# Patient Record
Sex: FEMALE | Race: WHITE | Employment: OTHER | URBAN - METROPOLITAN AREA
[De-identification: names, ages, dates, MRNs, and addresses within clinical notes are randomized per-mention and may not be internally consistent; named-entity substitution may affect disease eponyms.]

---

## 2022-12-10 PROBLEM — M81.0 AGE RELATED OSTEOPOROSIS: Status: ACTIVE | Noted: 2022-12-10

## 2022-12-10 PROBLEM — E03.9 HYPOTHYROIDISM: Status: ACTIVE | Noted: 2022-12-10

## 2022-12-10 PROBLEM — Z00.00 ENCOUNTER FOR MEDICARE ANNUAL WELLNESS EXAM: Status: ACTIVE | Noted: 2022-12-10

## 2022-12-10 PROBLEM — M81.0 OSTEOPOROSIS: Status: ACTIVE | Noted: 2022-12-10

## 2022-12-10 PROBLEM — M81.0 OSTEOPOROSIS: Status: RESOLVED | Noted: 2022-12-10 | Resolved: 2022-12-10

## 2023-11-14 ENCOUNTER — APPOINTMENT (OUTPATIENT)
Dept: GENERAL RADIOLOGY | Facility: HOSPITAL | Age: 88
End: 2023-11-14
Attending: EMERGENCY MEDICINE
Payer: MEDICARE

## 2023-11-14 ENCOUNTER — HOSPITAL ENCOUNTER (INPATIENT)
Facility: HOSPITAL | Age: 88
LOS: 1 days | Discharge: NURSING, REHAB OR LONG-TERM CARE | End: 2023-11-18
Attending: EMERGENCY MEDICINE | Admitting: HOSPITALIST
Payer: MEDICARE

## 2023-11-14 ENCOUNTER — APPOINTMENT (OUTPATIENT)
Dept: ULTRASOUND IMAGING | Facility: HOSPITAL | Age: 88
End: 2023-11-14
Attending: EMERGENCY MEDICINE
Payer: MEDICARE

## 2023-11-14 ENCOUNTER — APPOINTMENT (OUTPATIENT)
Dept: GENERAL RADIOLOGY | Facility: HOSPITAL | Age: 88
End: 2023-11-14
Payer: MEDICARE

## 2023-11-14 DIAGNOSIS — N39.0 URINARY TRACT INFECTION WITHOUT HEMATURIA, SITE UNSPECIFIED: Primary | ICD-10-CM

## 2023-11-14 PROBLEM — R53.1 WEAKNESS: Status: ACTIVE | Noted: 2023-11-14

## 2023-11-14 LAB
ALBUMIN SERPL-MCNC: 3.9 G/DL (ref 3.4–5)
ALBUMIN/GLOB SERPL: 0.8 {RATIO} (ref 1–2)
ALP LIVER SERPL-CCNC: 104 U/L
ALT SERPL-CCNC: 34 U/L
ANION GAP SERPL CALC-SCNC: 5 MMOL/L (ref 0–18)
AST SERPL-CCNC: 36 U/L (ref 15–37)
BASOPHILS # BLD AUTO: 0.05 X10(3) UL (ref 0–0.2)
BASOPHILS NFR BLD AUTO: 0.4 %
BILIRUB SERPL-MCNC: 1.5 MG/DL (ref 0.1–2)
BILIRUB UR QL STRIP.AUTO: NEGATIVE
BUN BLD-MCNC: 31 MG/DL (ref 9–23)
CALCIUM BLD-MCNC: 10 MG/DL (ref 8.5–10.1)
CHLORIDE SERPL-SCNC: 106 MMOL/L (ref 98–112)
CLARITY UR REFRACT.AUTO: CLEAR
CO2 SERPL-SCNC: 27 MMOL/L (ref 21–32)
COLOR UR AUTO: YELLOW
CREAT BLD-MCNC: 0.88 MG/DL
EGFRCR SERPLBLD CKD-EPI 2021: 61 ML/MIN/1.73M2 (ref 60–?)
EOSINOPHIL # BLD AUTO: 0.08 X10(3) UL (ref 0–0.7)
EOSINOPHIL NFR BLD AUTO: 0.7 %
ERYTHROCYTE [DISTWIDTH] IN BLOOD BY AUTOMATED COUNT: 17.4 %
FLUAV + FLUBV RNA SPEC NAA+PROBE: NEGATIVE
FLUAV + FLUBV RNA SPEC NAA+PROBE: NEGATIVE
GLOBULIN PLAS-MCNC: 4.6 G/DL (ref 2.8–4.4)
GLUCOSE BLD-MCNC: 107 MG/DL (ref 70–99)
GLUCOSE UR STRIP.AUTO-MCNC: NORMAL MG/DL
HCT VFR BLD AUTO: 41.5 %
HGB BLD-MCNC: 13.2 G/DL
IMM GRANULOCYTES # BLD AUTO: 0.03 X10(3) UL (ref 0–1)
IMM GRANULOCYTES NFR BLD: 0.3 %
LEUKOCYTE ESTERASE UR QL STRIP.AUTO: 25
LYMPHOCYTES # BLD AUTO: 0.87 X10(3) UL (ref 1–4)
LYMPHOCYTES NFR BLD AUTO: 7.5 %
MCH RBC QN AUTO: 24.1 PG (ref 26–34)
MCHC RBC AUTO-ENTMCNC: 31.8 G/DL (ref 31–37)
MCV RBC AUTO: 75.7 FL
MONOCYTES # BLD AUTO: 1.17 X10(3) UL (ref 0.1–1)
MONOCYTES NFR BLD AUTO: 10 %
NEUTROPHILS # BLD AUTO: 9.45 X10 (3) UL (ref 1.5–7.7)
NEUTROPHILS # BLD AUTO: 9.45 X10(3) UL (ref 1.5–7.7)
NEUTROPHILS NFR BLD AUTO: 81.1 %
NITRITE UR QL STRIP.AUTO: NEGATIVE
OSMOLALITY SERPL CALC.SUM OF ELEC: 293 MOSM/KG (ref 275–295)
PH UR STRIP.AUTO: 5.5 [PH] (ref 5–8)
PLATELET # BLD AUTO: 290 10(3)UL (ref 150–450)
POTASSIUM SERPL-SCNC: 3.9 MMOL/L (ref 3.5–5.1)
PROT SERPL-MCNC: 8.5 G/DL (ref 6.4–8.2)
PROT UR STRIP.AUTO-MCNC: 20 MG/DL
RBC # BLD AUTO: 5.48 X10(6)UL
RBC UR QL AUTO: NEGATIVE
RSV RNA SPEC NAA+PROBE: NEGATIVE
SARS-COV-2 RNA RESP QL NAA+PROBE: NOT DETECTED
SODIUM SERPL-SCNC: 138 MMOL/L (ref 136–145)
SP GR UR STRIP.AUTO: 1.02 (ref 1–1.03)
UROBILINOGEN UR STRIP.AUTO-MCNC: NORMAL MG/DL
WBC # BLD AUTO: 11.7 X10(3) UL (ref 4–11)

## 2023-11-14 PROCEDURE — 99222 1ST HOSP IP/OBS MODERATE 55: CPT | Performed by: HOSPITALIST

## 2023-11-14 PROCEDURE — 73560 X-RAY EXAM OF KNEE 1 OR 2: CPT | Performed by: EMERGENCY MEDICINE

## 2023-11-14 PROCEDURE — 93971 EXTREMITY STUDY: CPT | Performed by: EMERGENCY MEDICINE

## 2023-11-14 RX ORDER — SODIUM CHLORIDE 9 MG/ML
INJECTION, SOLUTION INTRAVENOUS CONTINUOUS
Status: DISCONTINUED | OUTPATIENT
Start: 2023-11-14 | End: 2023-11-14

## 2023-11-14 RX ORDER — ACETAMINOPHEN 500 MG
500 TABLET ORAL EVERY 4 HOURS PRN
Status: DISCONTINUED | OUTPATIENT
Start: 2023-11-14 | End: 2023-11-18

## 2023-11-14 RX ORDER — METOCLOPRAMIDE HYDROCHLORIDE 5 MG/ML
5 INJECTION INTRAMUSCULAR; INTRAVENOUS EVERY 8 HOURS PRN
Status: DISCONTINUED | OUTPATIENT
Start: 2023-11-14 | End: 2023-11-18

## 2023-11-14 RX ORDER — SODIUM CHLORIDE 9 MG/ML
INJECTION, SOLUTION INTRAVENOUS CONTINUOUS
Status: DISCONTINUED | OUTPATIENT
Start: 2023-11-15 | End: 2023-11-15

## 2023-11-14 RX ORDER — HEPARIN SODIUM 5000 [USP'U]/ML
5000 INJECTION, SOLUTION INTRAVENOUS; SUBCUTANEOUS EVERY 8 HOURS SCHEDULED
Status: COMPLETED | OUTPATIENT
Start: 2023-11-15 | End: 2023-11-15

## 2023-11-14 RX ORDER — ONDANSETRON 2 MG/ML
4 INJECTION INTRAMUSCULAR; INTRAVENOUS EVERY 6 HOURS PRN
Status: DISCONTINUED | OUTPATIENT
Start: 2023-11-14 | End: 2023-11-16

## 2023-11-14 RX ORDER — LEVOTHYROXINE SODIUM 0.12 MG/1
125 TABLET ORAL
Status: DISCONTINUED | OUTPATIENT
Start: 2023-11-15 | End: 2023-11-18

## 2023-11-14 NOTE — ED QUICK NOTES
Pt has body odor and arrives with soiled brief in place. Pt given partial bed bath, soiled clothing and linen removed, clean linen and brief applied. External female catheter placed and connected to suction. Pt has skin breakdown/pressure ulcers to multiple areas of [place] area. Pt reports she normally cleans/takes care of herself, lives at home with her son but her son is currently sick.

## 2023-11-14 NOTE — ED QUICK NOTES
Spoke with pt's son Peña Funk via telephone. He reports pt normally ambulates with a cane/walker but for the last few days has been unable to put weight on her feet or walk. Son reports he was concerned for a blood clot. He reports he has been contacting [phone] to help pt with taking care of herself however pt has been refusing to allow anybody to assist her and has been saying that she can clean herself. He reports pt has previously been at [facility] for rehab. INDY notified of conversation with pt's son.

## 2023-11-14 NOTE — ED QUICK NOTES
Rounding Completed. Report received from Ed, RN. Plan of Care reviewed. Waiting for urinalysis result. Elimination needs assessed. Provided warm blanket. Bed is locked and in lowest position. Call light within reach.

## 2023-11-15 ENCOUNTER — APPOINTMENT (OUTPATIENT)
Dept: CT IMAGING | Facility: HOSPITAL | Age: 88
End: 2023-11-15
Attending: ORTHOPAEDIC SURGERY
Payer: MEDICARE

## 2023-11-15 ENCOUNTER — APPOINTMENT (OUTPATIENT)
Dept: GENERAL RADIOLOGY | Facility: HOSPITAL | Age: 88
End: 2023-11-15
Attending: INTERNAL MEDICINE
Payer: MEDICARE

## 2023-11-15 PROBLEM — M25.561 ACUTE PAIN OF RIGHT KNEE: Status: ACTIVE | Noted: 2023-11-15

## 2023-11-15 PROBLEM — E87.6 HYPOKALEMIA: Status: ACTIVE | Noted: 2023-11-15

## 2023-11-15 LAB
ANION GAP SERPL CALC-SCNC: 6 MMOL/L (ref 0–18)
BASOPHILS # BLD AUTO: 0.05 X10(3) UL (ref 0–0.2)
BASOPHILS NFR BLD AUTO: 0.5 %
BUN BLD-MCNC: 24 MG/DL (ref 9–23)
CALCIUM BLD-MCNC: 8.7 MG/DL (ref 8.5–10.1)
CHLORIDE SERPL-SCNC: 110 MMOL/L (ref 98–112)
CO2 SERPL-SCNC: 29 MMOL/L (ref 21–32)
CREAT BLD-MCNC: 0.52 MG/DL
EGFRCR SERPLBLD CKD-EPI 2021: 86 ML/MIN/1.73M2 (ref 60–?)
EOSINOPHIL # BLD AUTO: 0.28 X10(3) UL (ref 0–0.7)
EOSINOPHIL NFR BLD AUTO: 2.9 %
ERYTHROCYTE [DISTWIDTH] IN BLOOD BY AUTOMATED COUNT: 15.8 %
GLUCOSE BLD-MCNC: 89 MG/DL (ref 70–99)
HCT VFR BLD AUTO: 32.9 %
HGB BLD-MCNC: 10.4 G/DL
IMM GRANULOCYTES # BLD AUTO: 0.03 X10(3) UL (ref 0–1)
IMM GRANULOCYTES NFR BLD: 0.3 %
LYMPHOCYTES # BLD AUTO: 1.29 X10(3) UL (ref 1–4)
LYMPHOCYTES NFR BLD AUTO: 13.2 %
MCH RBC QN AUTO: 24 PG (ref 26–34)
MCHC RBC AUTO-ENTMCNC: 31.6 G/DL (ref 31–37)
MCV RBC AUTO: 76 FL
MONOCYTES # BLD AUTO: 1.14 X10(3) UL (ref 0.1–1)
MONOCYTES NFR BLD AUTO: 11.7 %
NEUTROPHILS # BLD AUTO: 6.99 X10 (3) UL (ref 1.5–7.7)
NEUTROPHILS # BLD AUTO: 6.99 X10(3) UL (ref 1.5–7.7)
NEUTROPHILS NFR BLD AUTO: 71.4 %
OSMOLALITY SERPL CALC.SUM OF ELEC: 304 MOSM/KG (ref 275–295)
PLATELET # BLD AUTO: 216 10(3)UL (ref 150–450)
POTASSIUM SERPL-SCNC: 3.4 MMOL/L (ref 3.5–5.1)
RBC # BLD AUTO: 4.33 X10(6)UL
SODIUM SERPL-SCNC: 145 MMOL/L (ref 136–145)
WBC # BLD AUTO: 9.8 X10(3) UL (ref 4–11)

## 2023-11-15 PROCEDURE — 73502 X-RAY EXAM HIP UNI 2-3 VIEWS: CPT | Performed by: INTERNAL MEDICINE

## 2023-11-15 PROCEDURE — 76377 3D RENDER W/INTRP POSTPROCES: CPT | Performed by: ORTHOPAEDIC SURGERY

## 2023-11-15 PROCEDURE — 73700 CT LOWER EXTREMITY W/O DYE: CPT | Performed by: ORTHOPAEDIC SURGERY

## 2023-11-15 PROCEDURE — 99232 SBSQ HOSP IP/OBS MODERATE 35: CPT | Performed by: INTERNAL MEDICINE

## 2023-11-15 RX ORDER — POTASSIUM CHLORIDE 20 MEQ/1
40 TABLET, EXTENDED RELEASE ORAL ONCE
Status: COMPLETED | OUTPATIENT
Start: 2023-11-15 | End: 2023-11-15

## 2023-11-15 NOTE — PROGRESS NOTES
Consult received    Right hip IT fracture  Medical optimization and clearance tonight  CT hip to better characterize fracture  Hold thinners  NPO MN    To OR Tomorrow for right hip IMN/ORIF    Zuni Errol ALMARAZ

## 2023-11-15 NOTE — PROGRESS NOTES
NURSING ADMISSION NOTE      Patient admitted via Cart  Oriented to room. Safety precautions initiated. Bed in low position. Call light in reach. Admitted to room 527    Pt arrived alert and oriented x3-4, forgetful, on RA. Flowsheets complete. Unable to do med rec and navigator at this time, pt is poor historian. Attempted to call son for admission, no answer. Educated pt on POC, verbalizes understanding, no further questions.

## 2023-11-15 NOTE — PLAN OF CARE
Patient AAOx3-4. No tele. 2L placed while sleeping, placed back on room air while awake. Sub q heparin. Incontinent, purewick. Excoriation to groin, mepilex to sacrum. Seen by PT/OT. Regular diet. IVF discontinued. Patient rounded on routinely. Patient updated on plan of care. Received call from radiology of xray showing hip fracture. Hospitalist paged, received order for ortho c/s, bedrest order. Paged ortho.      Problem: Patient/Family Goals  Goal: Patient/Family Long Term Goal  Description: Patient's Long Term Goal: discharge back home    Interventions:  - follow and update POC  - See additional Care Plan goals for specific interventions  Outcome: Progressing  Goal: Patient/Family Short Term Goal  Description: Patient's Short Term Goal:   11/14 NOC: sleep, clean up    Interventions:   - cluster care  - See additional Care Plan goals for specific interventions  Outcome: Progressing

## 2023-11-15 NOTE — PLAN OF CARE
Pt is A&Ox3-4, forgetful, poor historian. VSS, afebrile. Maintaining O2 sats WDL on RA. No tele, Q8 vitals. Last BM 11/14. General diet. Incontinent, utilizing purewick. PT/OT to eval. Denies pain. Extended IV, unit draw. Wounds on sacrum and in [place] area, photos taken, mepilex placed. No further needs at this time, continue POC. Safety precautions in place. Spoke with Gail King the son this morning to complete navigator and med rec.     Problem: Patient/Family Goals  Goal: Patient/Family Long Term Goal  Description: Patient's Long Term Goal: discharge back home    Interventions:  - follow and update POC  - See additional Care Plan goals for specific interventions  Outcome: Progressing  Goal: Patient/Family Short Term Goal  Description: Patient's Short Term Goal:   11/14 NOC: sleep, clean up    Interventions:   - cluster care  - See additional Care Plan goals for specific interventions  Outcome: Progressing

## 2023-11-15 NOTE — ED QUICK NOTES
Rounding completed. Plan of care reviewed with patient and/or family. Patient's vitals updated; as per documentation. Patient's reports discomfort in low back/buttocks. Repositioned for comfort. Patient awaiting labs and additional imaging. Patient denies additional requests. Call light within reach.

## 2023-11-15 NOTE — ED QUICK NOTES
Rounding Completed    Plan of Care reviewed. Waiting for transport to inpatient bed. Elimination needs assessed. Provided repositioning and warm blanket. Bed is locked and in lowest position. Call light within reach.

## 2023-11-15 NOTE — CM/SW NOTE
11/15/23 1000   CM/SW Referral Data   Referral Source Physician   Reason for Referral Discharge planning   Informant Son   Patient Info   Patient's Current Mental Status at Time of Assessment Alert;Oriented;Memory Impairments   Patient's Home Environment Townhouse   Number of Levels in Home 2   Patient lives with Son   Patient Status Prior to Admission   Independent with ADLs and Mobility No   Pt. requires assistance with Housework;Driving   Services in place prior to admission DME/Supplies at home; Other (comment)   Type of DME/Supplies [employer]; [address]   Discharge Needs   Anticipated D/C needs To be determined   Choice of Post-Acute Provider   Informed patient of right to choose their preferred provider Yes     Order received for [facility] eval.  Per nursing notes, pt is a poor historian, so called son, Aubrey Halsted, for eval.    He reports pt is overall independent with her ADLs with her cane. She has recently been weaker and c/o knee and leg pain and using a walker instead. They live in a 2 story townhouse, but pt has prefers to sleep in a recliner on the 1st floor. She received Meals on Wheels and has qualified for homemaker/cg assist through [employer], but has refused in the past.      Son feels pt will need [facility] prior to discharge due to difficulty ambulating and weakness. Notified him that PT/OT evals are still pending and insurance auth would be required. If [facility] is recommended, he would like pt to go to [address] [employer] [employer] [address] if accepted. Social Determinants of Health with Concerns     Tobacco Use: Unknown (11/14/2023)    Patient History     Smoking Tobacco Use: Never     Smokeless Tobacco Use: Unknown   Transportation Needs: Unmet Transportation Needs (11/15/2023)    Transportation Needs     Lack of Transportation: Yes      Discussed SDOH concern for transportation w/son and he reports he drives pt everywhere and they have no needs.       Addendum 1400  Received call from Herminia from [employer] and post Acute Care must be in network: [facility], [facility] and the following [facility]:  [facility]  [facility] of [address] of [facility]   Referral sent and PASRR done  Once plan in place, call Haley Jasmine to expedite auth at [phone]    / to remain available for support and/or discharge planning.      Presley Bess MBA MSN, RN CTL/  [id number]

## 2023-11-15 NOTE — PHYSICAL THERAPY NOTE
PHYSICAL THERAPY EVALUATION - INPATIENT     Room Number: 527/527-A  Evaluation Date: 11/15/2023  Type of Evaluation: Initial  Physician Order: PT Eval and Treat    Presenting Problem: UTI     Reason for Therapy: Mobility Dysfunction and Discharge Planning    History related to current admission: Patient is a 80year old female admitted on 11/14/2023 from home for UTI, R knee pain. ASSESSMENT   In this PT evaluation, the patient presents with the following impairments decreased activity tolerance, balance, strength. These impairments and comorbidities manifest themselves as functional limitations in independent bed mobility, transfers, and gait. The patient is below baseline and would benefit from skilled inpatient PT to address the above deficits to assist patient in returning to prior to level of function. Functional outcome measures completed include [facility]. The AM-PAC '6-Clicks' Inpatient Basic Mobility Short Form was completed and this patient is demonstrating a Approx Degree of Impairment: 57.7%  degree of impairment in mobility. Research supports that patients with this level of impairment may benefit from [facility]. DISCHARGE RECOMMENDATIONS  PT Discharge Recommendations: Sub-acute rehabilitation    PLAN  PT Treatment Plan: Bed mobility; Body mechanics; Endurance; Energy conservation;Patient education; Family education;Gait training;Strengthening;Range of motion;Transfer training;Balance training  Rehab Potential : Fair  Frequency (Obs):  (2-3x/week)  Number of Visits to Meet Established Goals: 3      CURRENT GOALS    Goal #1 Patient is able to demonstrate supine - sit EOB @ level: modified independent     Goal #2 Patient is able to demonstrate transfers Sit to/from Stand at assistance level: modified independent     Goal #3 Patient is able to ambulate 50 feet with assist device: walker - rolling at assistance level: supervision     Goal #4    Goal #5    Goal #6    Goal Comments: Goals established on 11/15/2023    HOME SITUATION  Type of Home: House   Home Layout: Two level                Lives With: Son  Drives: No     Patient Regularly Uses: None    Prior Level of [place]: Pt reports mod ind prior to admit. Pt unable to state a specific event leading to RLE pain. Pt reports has been sponge bathing for unknown amount of time. SUBJECTIVE  Pt agreeable to PT. Pt speaking tangentially, hyperverbal throughout. OBJECTIVE  Precautions: Bed/chair alarm  Fall Risk: High fall risk    WEIGHT BEARING RESTRICTION  Weight Bearing Restriction: None                PAIN ASSESSMENT  Ratin          COGNITION  Following Commands:  follows one step commands with repetition    RANGE OF MOTION AND STRENGTH ASSESSMENT  Upper extremity ROM and strength are within functional limits     Lower extremity ROM is within functional limits     Lower extremity strength is within functional limits;  MMT not performed- increased difficulty with R knee extension due to pain. BALANCE  Static Sitting: Good  Dynamic Sitting: Fair +  Static Standing: Poor +  Dynamic Standing: Poor +    ADDITIONAL TESTS                                    ACTIVITY TOLERANCE                         O2 WALK       NEUROLOGICAL FINDINGS                        AM-PAC '6-Clicks' INPATIENT SHORT FORM - BASIC MOBILITY  How much difficulty does the patient currently have. .. Patient Difficulty: Turning over in bed (including adjusting bedclothes, sheets and blankets)?: A Little   Patient Difficulty: Sitting down on and standing up from a chair with arms (e.g., wheelchair, bedside commode, etc.): A Little   Patient Difficulty: Moving from lying on back to sitting on the side of the bed?: A Little   How much help from another person does the patient currently need. ..    Help from Another: Moving to and from a bed to a chair (including a wheelchair)?: A Little   Help from Another: Need to walk in hospital room?: A Lot   Help from Another: Climbing 3-5 steps with a railing?: Total       AM-PAC Score:  Raw Score: 15   Approx Degree of Impairment: 57.7%   Standardized Score (AM-PAC Scale): 39.45   CMS Modifier (G-Code): CK    FUNCTIONAL ABILITY STATUS  Gait Assessment   Functional Mobility/Gait Assessment  Gait Assistance: Moderate assistance  Distance (ft): 1  Assistive Device: Rolling walker (\)  Pattern:  (not fully picking up BLEs- shimmying heels to the side)    Skilled Therapy Provided     Bed Mobility:  Rolling: supervision  Supine to sit: min A   Sit to supine: NT     Transfer Mobility:  Sit to stand: min a   Stand to sit: min A  Gait = Ambulation as above to chair only. Therapist's Comments: Pt encouraged to be OOB, to call for assist.    Exercise/Education Provided:  Bed mobility  Body mechanics  Functional activity tolerated  Transfer training    Patient End of Session: Up in chair;Needs met;Call light within reach;RN aware of session/findings; All patient questions and concerns addressed; Alarm set; Discussed recommendations with /      Patient Evaluation Complexity Level:  History Moderate - 1 or 2 personal factors and/or co-morbidities   Examination of body systems Moderate - addressing a total of 3 or more elements   Clinical Presentation Moderate - Evolving   Clinical Decision Making Moderate - Evolving       PT Session Time: 20 minutes    Therapeutic Activity: 8 minutes

## 2023-11-15 NOTE — ED QUICK NOTES
Per patient request, update provided to [facility] ([phone]). Plan of care discussed in depth, all questions answered at this time. Patient awaiting transport to inpatient bed. Bed is locked and in lowest position.  Patient is resting comfortably on cot at this time

## 2023-11-16 ENCOUNTER — APPOINTMENT (OUTPATIENT)
Dept: GENERAL RADIOLOGY | Facility: HOSPITAL | Age: 88
End: 2023-11-16
Attending: ORTHOPAEDIC SURGERY
Payer: MEDICARE

## 2023-11-16 ENCOUNTER — ANESTHESIA (OUTPATIENT)
Dept: SURGERY | Facility: HOSPITAL | Age: 88
End: 2023-11-16
Payer: MEDICARE

## 2023-11-16 ENCOUNTER — ANESTHESIA EVENT (OUTPATIENT)
Dept: SURGERY | Facility: HOSPITAL | Age: 88
End: 2023-11-16
Payer: MEDICARE

## 2023-11-16 PROBLEM — S72.001A CLOSED FRACTURE OF RIGHT HIP (HCC): Status: ACTIVE | Noted: 2023-11-16

## 2023-11-16 LAB — POTASSIUM SERPL-SCNC: 4 MMOL/L (ref 3.5–5.1)

## 2023-11-16 PROCEDURE — 0QH636Z INSERTION OF INTRAMEDULLARY INTERNAL FIXATION DEVICE INTO RIGHT UPPER FEMUR, PERCUTANEOUS APPROACH: ICD-10-PCS | Performed by: ORTHOPAEDIC SURGERY

## 2023-11-16 PROCEDURE — 99232 SBSQ HOSP IP/OBS MODERATE 35: CPT | Performed by: INTERNAL MEDICINE

## 2023-11-16 PROCEDURE — 76000 FLUOROSCOPY <1 HR PHYS/QHP: CPT | Performed by: ORTHOPAEDIC SURGERY

## 2023-11-16 DEVICE — TFNA FENESTRATED SCREW 95MM - STERILE
Type: IMPLANTABLE DEVICE | Site: HIP | Status: FUNCTIONAL
Brand: TFN-ADVANCE

## 2023-11-16 DEVICE — 10MM/130 DEG TI CANN TFNA 170MM - STERILE
Type: IMPLANTABLE DEVICE | Site: HIP | Status: FUNCTIONAL
Brand: TFN-ADVANCE

## 2023-11-16 DEVICE — 5.0MM TI LOCKING SCREW W/T25 STARDRIVE 40MM F/IM NAIL-STER: Type: IMPLANTABLE DEVICE | Site: HIP | Status: FUNCTIONAL

## 2023-11-16 RX ORDER — DOXEPIN HYDROCHLORIDE 50 MG/1
1 CAPSULE ORAL DAILY
Status: DISCONTINUED | OUTPATIENT
Start: 2023-11-17 | End: 2023-11-18

## 2023-11-16 RX ORDER — ONDANSETRON 2 MG/ML
4 INJECTION INTRAMUSCULAR; INTRAVENOUS EVERY 6 HOURS PRN
Status: DISCONTINUED | OUTPATIENT
Start: 2023-11-16 | End: 2023-11-16 | Stop reason: HOSPADM

## 2023-11-16 RX ORDER — HYDROMORPHONE HYDROCHLORIDE 1 MG/ML
0.4 INJECTION, SOLUTION INTRAMUSCULAR; INTRAVENOUS; SUBCUTANEOUS EVERY 5 MIN PRN
Status: DISCONTINUED | OUTPATIENT
Start: 2023-11-16 | End: 2023-11-16 | Stop reason: HOSPADM

## 2023-11-16 RX ORDER — METOCLOPRAMIDE HYDROCHLORIDE 5 MG/ML
10 INJECTION INTRAMUSCULAR; INTRAVENOUS EVERY 8 HOURS PRN
Status: DISCONTINUED | OUTPATIENT
Start: 2023-11-16 | End: 2023-11-16 | Stop reason: HOSPADM

## 2023-11-16 RX ORDER — DIPHENHYDRAMINE HYDROCHLORIDE 50 MG/ML
12.5 INJECTION INTRAMUSCULAR; INTRAVENOUS AS NEEDED
Status: DISCONTINUED | OUTPATIENT
Start: 2023-11-16 | End: 2023-11-16 | Stop reason: HOSPADM

## 2023-11-16 RX ORDER — LABETALOL HYDROCHLORIDE 5 MG/ML
5 INJECTION, SOLUTION INTRAVENOUS EVERY 5 MIN PRN
Status: DISCONTINUED | OUTPATIENT
Start: 2023-11-16 | End: 2023-11-16 | Stop reason: HOSPADM

## 2023-11-16 RX ORDER — LIDOCAINE HYDROCHLORIDE 10 MG/ML
INJECTION, SOLUTION EPIDURAL; INFILTRATION; INTRACAUDAL; PERINEURAL AS NEEDED
Status: DISCONTINUED | OUTPATIENT
Start: 2023-11-16 | End: 2023-11-16 | Stop reason: SURG

## 2023-11-16 RX ORDER — ONDANSETRON 2 MG/ML
4 INJECTION INTRAMUSCULAR; INTRAVENOUS EVERY 6 HOURS PRN
Status: DISCONTINUED | OUTPATIENT
Start: 2023-11-16 | End: 2023-11-18

## 2023-11-16 RX ORDER — MORPHINE SULFATE 4 MG/ML
1 INJECTION, SOLUTION INTRAMUSCULAR; INTRAVENOUS EVERY 5 MIN PRN
Status: DISCONTINUED | OUTPATIENT
Start: 2023-11-16 | End: 2023-11-16 | Stop reason: HOSPADM

## 2023-11-16 RX ORDER — NALOXONE HYDROCHLORIDE 0.4 MG/ML
80 INJECTION, SOLUTION INTRAMUSCULAR; INTRAVENOUS; SUBCUTANEOUS AS NEEDED
Status: DISCONTINUED | OUTPATIENT
Start: 2023-11-16 | End: 2023-11-16 | Stop reason: HOSPADM

## 2023-11-16 RX ORDER — SODIUM CHLORIDE 9 MG/ML
INJECTION, SOLUTION INTRAVENOUS CONTINUOUS
Status: DISCONTINUED | OUTPATIENT
Start: 2023-11-16 | End: 2023-11-16 | Stop reason: HOSPADM

## 2023-11-16 RX ORDER — CEFAZOLIN SODIUM 1 G/3ML
INJECTION, POWDER, FOR SOLUTION INTRAMUSCULAR; INTRAVENOUS AS NEEDED
Status: DISCONTINUED | OUTPATIENT
Start: 2023-11-16 | End: 2023-11-16 | Stop reason: SURG

## 2023-11-16 RX ORDER — ONDANSETRON 2 MG/ML
INJECTION INTRAMUSCULAR; INTRAVENOUS AS NEEDED
Status: DISCONTINUED | OUTPATIENT
Start: 2023-11-16 | End: 2023-11-16 | Stop reason: SURG

## 2023-11-16 RX ORDER — BISACODYL 10 MG
10 SUPPOSITORY, RECTAL RECTAL
Status: DISCONTINUED | OUTPATIENT
Start: 2023-11-16 | End: 2023-11-18

## 2023-11-16 RX ORDER — HYDRALAZINE HYDROCHLORIDE 20 MG/ML
5 INJECTION INTRAMUSCULAR; INTRAVENOUS
Status: CANCELLED | OUTPATIENT
Start: 2023-11-16

## 2023-11-16 RX ORDER — ROCURONIUM BROMIDE 10 MG/ML
INJECTION, SOLUTION INTRAVENOUS AS NEEDED
Status: DISCONTINUED | OUTPATIENT
Start: 2023-11-16 | End: 2023-11-16 | Stop reason: SURG

## 2023-11-16 RX ORDER — POLYETHYLENE GLYCOL 3350 17 G/17G
17 POWDER, FOR SOLUTION ORAL DAILY PRN
Status: DISCONTINUED | OUTPATIENT
Start: 2023-11-16 | End: 2023-11-18

## 2023-11-16 RX ORDER — ENEMA 19; 7 G/133ML; G/133ML
1 ENEMA RECTAL ONCE AS NEEDED
Status: DISCONTINUED | OUTPATIENT
Start: 2023-11-16 | End: 2023-11-18

## 2023-11-16 RX ORDER — HYDROMORPHONE HYDROCHLORIDE 1 MG/ML
0.6 INJECTION, SOLUTION INTRAMUSCULAR; INTRAVENOUS; SUBCUTANEOUS EVERY 5 MIN PRN
Status: DISCONTINUED | OUTPATIENT
Start: 2023-11-16 | End: 2023-11-16 | Stop reason: HOSPADM

## 2023-11-16 RX ORDER — DOCUSATE SODIUM 100 MG/1
100 CAPSULE, LIQUID FILLED ORAL 2 TIMES DAILY
Status: DISCONTINUED | OUTPATIENT
Start: 2023-11-16 | End: 2023-11-18

## 2023-11-16 RX ORDER — CEFAZOLIN SODIUM/WATER 2 G/20 ML
2 SYRINGE (ML) INTRAVENOUS EVERY 8 HOURS
Qty: 40 ML | Refills: 0 | Status: COMPLETED | OUTPATIENT
Start: 2023-11-17 | End: 2023-11-17

## 2023-11-16 RX ORDER — SODIUM CHLORIDE 9 MG/ML
INJECTION, SOLUTION INTRAVENOUS CONTINUOUS PRN
Status: DISCONTINUED | OUTPATIENT
Start: 2023-11-16 | End: 2023-11-16 | Stop reason: SURG

## 2023-11-16 RX ORDER — SENNOSIDES 8.6 MG
17.2 TABLET ORAL NIGHTLY
Status: DISCONTINUED | OUTPATIENT
Start: 2023-11-16 | End: 2023-11-18

## 2023-11-16 RX ORDER — ASPIRIN 325 MG
325 TABLET ORAL DAILY
Status: DISCONTINUED | OUTPATIENT
Start: 2023-11-17 | End: 2023-11-17

## 2023-11-16 RX ORDER — HYDROMORPHONE HYDROCHLORIDE 1 MG/ML
0.2 INJECTION, SOLUTION INTRAMUSCULAR; INTRAVENOUS; SUBCUTANEOUS EVERY 5 MIN PRN
Status: DISCONTINUED | OUTPATIENT
Start: 2023-11-16 | End: 2023-11-16 | Stop reason: HOSPADM

## 2023-11-16 RX ORDER — HYDROMORPHONE HYDROCHLORIDE 1 MG/ML
INJECTION, SOLUTION INTRAMUSCULAR; INTRAVENOUS; SUBCUTANEOUS
Status: COMPLETED
Start: 2023-11-16 | End: 2023-11-16

## 2023-11-16 RX ADMIN — ONDANSETRON 4 MG: 2 INJECTION INTRAMUSCULAR; INTRAVENOUS at 18:13:00

## 2023-11-16 RX ADMIN — ROCURONIUM BROMIDE 10 MG: 10 INJECTION, SOLUTION INTRAVENOUS at 17:16:00

## 2023-11-16 RX ADMIN — CEFAZOLIN SODIUM 2 G: 1 INJECTION, POWDER, FOR SOLUTION INTRAMUSCULAR; INTRAVENOUS at 17:18:00

## 2023-11-16 RX ADMIN — LIDOCAINE HYDROCHLORIDE 100 MG: 10 INJECTION, SOLUTION EPIDURAL; INFILTRATION; INTRACAUDAL; PERINEURAL at 17:16:00

## 2023-11-16 RX ADMIN — SODIUM CHLORIDE: 9 INJECTION, SOLUTION INTRAVENOUS at 17:07:00

## 2023-11-16 NOTE — PLAN OF CARE
Pt is A&Ox3-4, forgetful, poor historian. VSS, afebrile. Maintaining O2 sats WDL on RA. No tele, Q8 vitals. Last BM 11/14. General diet. Incontinent, utilizing purewick. PT/OT to eval. Denies pain. Extended IV, unit draw. Wounds on sacrum and in [place] area, photos taken, mepilex placed. No further needs at this time, continue POC. Safety precautions in place.      Problem: Patient/Family Goals  Goal: Patient/Family Long Term Goal  Description: Patient's Long Term Goal: discharge back home    Interventions:  - follow and update POC  - See additional Care Plan goals for specific interventions  Outcome: Progressing  Goal: Patient/Family Short Term Goal  Description: Patient's Short Term Goal:   11/14 NOC: sleep, clean up  11/15 NOC: sleep    Interventions:   - cluster care  - See additional Care Plan goals for specific interventions  Outcome: Progressing

## 2023-11-16 NOTE — PHYSICAL THERAPY NOTE
Per chart review , CT of R hip indicates displaced intertrochanteric fracture of the right femoral neck, in addition to displaced avulsion fx of the lesser trochanter. Per ortho, sx for today for R hip IMN/ORIF . Will f/u s/p procedure, will require new orders for PT when appropriate.

## 2023-11-16 NOTE — PLAN OF CARE
Pt A/O x3-4. On room air, no tele ordered, vitals WDL. Pt is incontinent x2, bedrest, NPO since midnight. Plan for surgery this afternoon. No complaints of pain. POC discussed with pt, son updated over phone.      Problem: Patient/Family Goals  Goal: Patient/Family Long Term Goal  Description: Patient's Long Term Goal: discharge back home    Interventions:  - follow and update POC  - See additional Care Plan goals for specific interventions  Outcome: Progressing  Goal: Patient/Family Short Term Goal  Description: Patient's Short Term Goal:   11/14 NOC: sleep, clean up  11/15 NOC: sleep  11/16: manage pain    Interventions:   - cluster care  - See additional Care Plan goals for specific interventions  Outcome: Progressing

## 2023-11-16 NOTE — OPERATIVE REPORT
OPERATIVE REPORT    Patient Name: Thierry Palma  Age:  80year old  Sex: female  MRN: [id number]  : 1928  Date of Admission: 2023  Date of Surgery: 23  Primary Surgeon: Ben Alexander MD  Assistant(s): Cintia [facility]  Skilled assistance was needed for patient positioning, prepping and draping, instrument holding and passing, retracting and suturing. Preoperative Diagnosis:   Right hip intertrochanteric Hip Fracture     Postoperative Diagnosis:   Right hip intertrochanteric Hip Fracture      Operation Performed:   Intramedullary fixation of right femur     Implants:   Synthes TFNA short nail, 130 degree neck ankle  10 mm diameter  95 mm helical blade     Anesthesia: General     Complications: none    Estimated Blood Loss: 100 mL    Tourniquet Time: None    Specimens: none    Antibiotics: given    INDICATIONS FOR SURGERY:     Thierry Palma is a pleasant 80year old who was admitted from the ED after a fall. X-rays showed an right femur fracture. We discussed surgical treatment options including intramedullary nailing. The risks, benefits, and alternatives of hip nailing were discussed extensively. The risks include but are not limited to infection, DVT, PE, damage to nerves or blood vessels, continued pain, hip stiffness/loss of motion, malunion/nonunion, possibility of AVN or future arthrosis of the hip, traction injury including pudendal nerve palsy and ankle pain from traction boot, and the risks of anesthesia. The typical post-operative course and rehab plan were discussed as well. Activity restrictions following the surgery were emphasized. An informed consent form was signed and placed on the chart prior to coming to the Operating Room.      OPERATIVE FINDINGS:  Right hip IT fracture    Description of Procedure:   Pre-operative xrays of the AP pelvis, femur and hip were measured and it was determined to use a 10 size nail    The patient was identified in the preop holding area and the Right hip was marked as the correct operative site. The patient was given a dose of perioperative antibiotics and then brought to the Operating Room and placed supine on the operating table. After adequate anesthesia was obtained, the patient was positioned on the [facility] fracture table, with a well-padded post in the perineum. A C-arm was used to confirm reduction of the hip fracture with traction. Once fracture reduction was achieved, the operative extremity was prepped and draped in the standard sterile fashion. A surgical time out was performed confirming that the Right hip was the correct operative site. A longitudinal 3cm incision was made proximal to the tip of the greater trochanter. Bluntly dissect of the soft tissues including the IT band and the gluteus tendons down to the tip of the greater trochanter was then performed. A threaded guide wire was passed through the tip of the trochanter with fluoroscopic assistance to ensure the wire was down the center of the femoral canal in both AP and lateral planes. An opening reamer was used over the guide wire to create the opening for the short nail in the trochanter. The guide wire was removed with the reamer. The Synthes TFNA short nail was then inserted into the femoral canal with aiming arm guide attached. It was lightly tapped down the canal until the lag screw trajectory was positioned appropriately in alignment with the femoral neck. This was confirmed on fluoroscopy. I then checked femoral anteversion and the trajectory of the aiming arm on lateral xray with fluoroscopy an confirmed that it was centered on the femoral neck  I then placed the triple trochar sleeve through the aiming arm to determine where to make a skin incision distally along the thigh. A 1 inch incision along the lateral femur was then made with a knife through the thigh fascia down to bone.  A hemostat was used to spread the muscle off the bone  The triple trochar was then placed through the incision down to the bone. I then brought the sleeve down to the bone with the synthes trochar device. This was confirmed on xray. I then drilled in the guidewire into the femoral head. Positioning of the wire was confirmed on AP and lateral xrays until the wire was in an adequate position. I then measured off of the wire for the helical blade length  I then utilized a lateral cortical opening reamer through the triple trochar. The helical blade was then inserted by hand first then tapped firmly until it was seated completely into the proximal femur  The position of the helical blade was confirmed on AP and lateral xrays and the tip apex distance was considered appropriate. I then tightened the locking screw from above the nail through the aiming arm until it was tight. A distal locking screw was placed in the distal slot of the nail to control rotation of the fracture. Another 2 cm incision was made based off the the distal locking screw sleeve position through the aiming guide. Incision was brought down through the thigh fascia and the muscle was cleared from the bone bluntly. I then drilled through the trochar and measured the screw length with a measuring guide. The screw was then placed into the distal portion of the nail by hand    Final fluoroscopic shots were taken of the femur. The wounds were irrigated and attention was turned to wound closure. The subcutaneous tissue was closed with 2-0 Vicryl suture and the skin was closed with skin staples. Sterile dressings were applied consisting of 4 x 4's and Medipore tape. The patient was then awakened from anesthesia and transferred to the Recovery Room in stable condition.     Post-operative plan  TTWB to RLE for 3 weeks  PT  Ancef periop  Dressings for 1 week then change to daily dry dressings  Aspirin Daily for 6 weeks, 325 mg EC  FU with me in 3 weeks for xray and staple removal    Missy Hughes MD  Orthopedic Surgery

## 2023-11-16 NOTE — ANESTHESIA PROCEDURE NOTES
Airway  Date/Time: 11/16/2023 5:16 PM  Urgency: elective    Airway not difficult    General Information and Staff    Patient location during procedure: OR  Anesthesiologist: Singh Hess MD  Performed: anesthesiologist   Performed by: Singh Hess MD  Authorized by: Singh Hess MD      Indications and Patient Condition  Indications for airway management: anesthesia  Spontaneous Ventilation: absent  Sedation level: deep  Preoxygenated: yes  Patient position: sniffing  Mask difficulty assessment: 0 - not attempted    Final Airway Details  Final airway type: endotracheal airway      Successful airway: ETT  Cuffed: yes   Successful intubation technique: Video laryngoscopy  Facilitating devices/methods: intubating stylet  Endotracheal tube insertion site: oral  Blade: GlideScope  Blade size: #3  ETT size (mm): 7.0    Cormack-Lehane Classification: grade I - full view of glottis  Placement verified by: capnometry   Cuff volume (mL): 9  Measured from: lips  ETT to lips (cm): 22  Number of attempts at approach: 1  Number of other approaches attempted: 0    Additional Comments  Easy, atraumatic

## 2023-11-17 PROBLEM — S72.009A HIP FRACTURE (HCC): Status: ACTIVE | Noted: 2023-11-17

## 2023-11-17 PROBLEM — D62 ACUTE BLOOD LOSS ANEMIA: Status: ACTIVE | Noted: 2023-11-17

## 2023-11-17 LAB
HCT VFR BLD AUTO: 32.1 %
HGB BLD-MCNC: 9.9 G/DL

## 2023-11-17 PROCEDURE — 99232 SBSQ HOSP IP/OBS MODERATE 35: CPT | Performed by: INTERNAL MEDICINE

## 2023-11-17 NOTE — PLAN OF CARE
A/o x4. 2L//IS encouraged. Regular diet denies n/v. LBM 11/15. DTV @ 0300. Denies pain. RUE extended PIV. SCD's/ankle pumps. Aquacel dsg in place cdi. PT/OT to see. POC updated with pt. All safety measures in place. Call light within reach instructed pt to call for help or assistance.

## 2023-11-17 NOTE — PHYSICAL THERAPY NOTE
PHYSICAL THERAPY EVALUATION - INPATIENT     Room Number: 351/351-A  Evaluation Date: 11/17/2023  Type of Evaluation: Initial   Physician Order: PT Eval and Treat    Presenting Problem: UTI, RLE weakness w/ R hip intertrochanteric fracture s/p intramedullary fixation  Co-Morbidities : hypothyroidism  Reason for Therapy: Mobility Dysfunction and Discharge Planning    History related to current admission: Patient is a 80year old female admitted on 11/14/2023 from home for weakness,s UTI, R knee pain. Pt unable to get herself up. Son called EMS and pt arrived to ED very disheveled with clothing covered in urine and feces. After initial therapy evals on 11/15, imaging revealed right hip intertrochanteric fracture. She is now s/p intramedullary fixation to R femur completed 11/16. She is to be TTWB for 3 weeks. ASSESSMENT   In this PT evaluation, the patient presents with the following impairments decr awareness of weight bearing restrictions, decr awareness of need for safety and assistance, incr pain/discomfort, global decr in strength, decr activity tolerance/endurance, decr in functional mobility. These impairments and comorbidities manifest themselves as functional limitations in independent bed mobility, transfers, and gait. The patient is below baseline and would benefit from skilled inpatient PT to address the above deficits to assist patient in returning to prior to level of function. Functional outcome measures completed include AMPAC. The -PAC '6-Clicks' Inpatient Basic Mobility Short Form was completed and this patient is demonstrating a Approx Degree of Impairment: 57.7%  degree of impairment in mobility. Research supports that patients with this level of impairment may benefit from [facility]. DISCHARGE RECOMMENDATIONS  PT Discharge Recommendations: Sub-acute rehabilitation    PLAN  PT Treatment Plan: Bed mobility; Body mechanics; Endurance; Coordination; Energy conservation;Patient education; Family education;Gait training;Neuromuscular re-educate;Range of motion;Strengthening;Stoop training;Stair training;Transfer training;Balance training  Rehab Potential : Fair  Frequency (Obs): 3-5x/week  Number of Visits to Meet Established Goals: 5      CURRENT GOALS    Goal #1 Patient is able to demonstrate supine - sit EOB @ level: minimum assistance     Goal #2 Patient is able to demonstrate transfers Sit to/from Stand at assistance level: minimum assistance     Goal #3 Patient able to perform lateral transfer w/ RW while maintaining TTWB to RLE at [facility]. Goal #4    Goal #5    Goal #6    Goal Comments: Goals established on 11/17/2023    HOME SITUATION  Type of [facility]: [facility]   Home Layout: Two level; Able to live on main level  Stairs to Enter : 2             Lives With: Son  Drives: No  Patient Owned Equipment: None  Patient Regularly Uses: None    Prior Level of [place]: Pt reports mod ind prior to admit. Pt unable to state a specific event leading to RLE pain. Pt reports has been sponge bathing for unknown amount of time. Son is primary caretaker. Lately the patient is unable to walk. Lately has been complaining of pain in the right knee that radiates up to the leg. The primary caretaker/son recently undergoing several health issues and is unable to take care of her. \"      SUBJECTIVE  \"I wouldn't dare. \"      OBJECTIVE  Precautions: Bed/chair alarm  Fall Risk: High fall risk    WEIGHT BEARING RESTRICTION  Weight Bearing Restriction: R lower extremity        R Lower Extremity: Toe Touch Weight Bearing       PAIN ASSESSMENT  Rating: Unable to rate  Location: right hip  Management Techniques: Activity promotion; Body mechanics;Repositioning    COGNITION  Overall Cognitive Status:  WFL - within functional limits  Safety Judgement:  decreased awareness of need for assistance and decreased awareness of need for safety  Awareness of Errors:  assistance required to identify errors made, assistance required to correct errors made, and decreased awareness of errors   Awareness of Deficits:  decreased awareness of deficits  Problem Solving:  assistance required to identify errors made, assistance required to generate solutions, and assistance required to implement solutions    RANGE OF MOTION AND STRENGTH ASSESSMENT  Upper extremity ROM and strength are within functional limits     Lower extremity ROM is within functional limits     Lower extremity strength is within functional limits       BALANCE  Static Sitting: Fair +  Dynamic Sitting: Fair  Static Standing: Poor +  Dynamic Standing: Poor    ADDITIONAL TESTS                                    ACTIVITY TOLERANCE                         O2 WALK       NEUROLOGICAL FINDINGS                        AM-PAC '6-Clicks' INPATIENT SHORT FORM - BASIC MOBILITY  How much difficulty does the patient currently have. .. Patient Difficulty: Turning over in bed (including adjusting bedclothes, sheets and blankets)?: A Little   Patient Difficulty: Sitting down on and standing up from a chair with arms (e.g., wheelchair, bedside commode, etc.): A Little   Patient Difficulty: Moving from lying on back to sitting on the side of the bed?: A Little   How much help from another person does the patient currently need. ..    Help from Another: Moving to and from a bed to a chair (including a wheelchair)?: A Little   Help from Another: Need to walk in hospital room?: A Lot   Help from Another: Climbing 3-5 steps with a railing?: Total       AM-PAC Score:  Raw Score: 15   Approx Degree of Impairment: 57.7%   Standardized Score (AM-PAC Scale): 39.45   CMS Modifier (G-Code): CK    FUNCTIONAL ABILITY STATUS  Gait Assessment   Functional Mobility/Gait Assessment  Gait Assistance: Not tested  Distance (ft): 1  Assistive Device: Rolling walker (\)  Pattern:  (not fully picking up BLEs- shimmying heels to the side)    Skilled Therapy Provided     Bed Mobility:  Rolling: NT  Supine to sit: w/ HOB elevated significantly maxA to reach sitting EOB, assist required to scoot forwards as well   Sit to supine: NT     Transfer Mobility:  Sit to stand: mod to maxA to RW- verbal cues for hand placement. 2nd person to ensure adherence to TTWB precaution which she was only able to do about 25% of the time. Stand to sit: maxA   Gait = NT    Therapist's Comments: Patient presents sitting up in bed. Discussed role and goal of physical therapy in hospital setting. Pt in agreement to session, very chatty throughout, but very tangential conversations. Bed mobility at [facility] with incr time. Incr difficulty with sit to stand transfer due to inability to adhere to TTWB status. Bedside chair brought to L side for ease of transfer. Completed lateral transfer at mod to maxA, was able to demonstrate a few pivots of LLE to pivot to chair w/ RW. Pt upright in chair at end of session. Discussed importance of continued out of bed functional mobility. Exercise/Education Provided:  Bed mobility  Body mechanics  Energy conservation  Functional activity tolerated  Transfer training    Patient End of Session: Up in chair;Needs met;Call light within reach;RN aware of session/findings; All patient questions and concerns addressed;SCDs in place; Alarm set; Discussed recommendations with /      Patient Evaluation Complexity Level:  History Moderate - 1 or 2 personal factors and/or co-morbidities   Examination of body systems Moderate - addressing a total of 3 or more elements   Clinical Presentation Moderate - Evolving   Clinical Decision Making Moderate - Evolving       PT Session Time: 33 minutes  Gait Training:  minutes  Therapeutic Activity: 17 minutes  Neuromuscular Re-education:  minutes  Therapeutic Exercise:  minutes Lab Facility: [id number] Lab Facility: [id number]

## 2023-11-17 NOTE — CM/SW NOTE
11/17/23 1023   Choice of Post-Acute Provider   Informed patient of right to choose their preferred provider Yes   List of appropriate post-acute services provided to patient/family with quality data Yes   Patient/family choice [facility] [address] given to Patient; Son       Met with pt and provided [facility] list of accepting [facility] facilities. Informed pt that Leonard Kohler is not in network with her [employer]. Pt agreeable with [address]. SW spoke with pt's son, Eve Loud by phone. He is also agreeable with plan for [address]. Discussed possible weekend DC pending medical clearance and insurance approval.    [address] reserved in [facility]. [facility]. Spoke with Vignesh Christian from [address] [place] [address] ([phone]) she will contact [facility] to provide auth for [facility]. / to remain available for support and/or discharge planning.      [facility] - [address] [facility]  Discharge Planner  [phone]

## 2023-11-17 NOTE — CM/SW NOTE
Spoke with Ernestina Joaquin from [employer] who confirmed [address] [facility] [address] for [facility] at [employer] is approved through end of day Prashanth. She sent approval directly to [facility]. Await medical clearance for DC. / to remain available for support and/or discharge planning.      [facility] - [address] [facility]  Discharge Planner  [phone]

## 2023-11-17 NOTE — PLAN OF CARE
A&Ox4. VSS. On room air. . IS encouraged. SCDs. Ankle pumps encouraged. Tolerating diet. Last BM 11/15. Voiding via purewick. Pain managed with PO medication. Dressing to right hip C/D/I. Plan is for PT/OT eval. Patient updated and in agreement with plan of care. Safety precautions in place. Instructed patient to call for assistance, call light within reach.

## 2023-11-18 VITALS
TEMPERATURE: 98 F | SYSTOLIC BLOOD PRESSURE: 156 MMHG | OXYGEN SATURATION: 95 % | HEIGHT: 63 IN | HEART RATE: 65 BPM | WEIGHT: 118 LBS | DIASTOLIC BLOOD PRESSURE: 66 MMHG | RESPIRATION RATE: 18 BRPM | BODY MASS INDEX: 20.91 KG/M2

## 2023-11-18 LAB
ANION GAP SERPL CALC-SCNC: 5 MMOL/L (ref 0–18)
ATRIAL RATE: 67 BPM
BUN BLD-MCNC: 12 MG/DL (ref 9–23)
CALCIUM BLD-MCNC: 8.4 MG/DL (ref 8.5–10.1)
CHLORIDE SERPL-SCNC: 109 MMOL/L (ref 98–112)
CO2 SERPL-SCNC: 28 MMOL/L (ref 21–32)
CREAT BLD-MCNC: 0.38 MG/DL
EGFRCR SERPLBLD CKD-EPI 2021: 93 ML/MIN/1.73M2 (ref 60–?)
ERYTHROCYTE [DISTWIDTH] IN BLOOD BY AUTOMATED COUNT: 15.7 %
GLUCOSE BLD-MCNC: 89 MG/DL (ref 70–99)
HCT VFR BLD AUTO: 29.9 %
HGB BLD-MCNC: 9.6 G/DL
MCH RBC QN AUTO: 24.1 PG (ref 26–34)
MCHC RBC AUTO-ENTMCNC: 32.1 G/DL (ref 31–37)
MCV RBC AUTO: 75.1 FL
OSMOLALITY SERPL CALC.SUM OF ELEC: 293 MOSM/KG (ref 275–295)
P AXIS: -20 DEGREES
P-R INTERVAL: 110 MS
PLATELET # BLD AUTO: 255 10(3)UL (ref 150–450)
POTASSIUM SERPL-SCNC: 3.7 MMOL/L (ref 3.5–5.1)
Q-T INTERVAL: 376 MS
QRS DURATION: 82 MS
QTC CALCULATION (BEZET): 397 MS
R AXIS: 60 DEGREES
RBC # BLD AUTO: 3.98 X10(6)UL
SODIUM SERPL-SCNC: 142 MMOL/L (ref 136–145)
T AXIS: 68 DEGREES
VENTRICULAR RATE: 67 BPM
WBC # BLD AUTO: 9.9 X10(3) UL (ref 4–11)

## 2023-11-18 PROCEDURE — 99239 HOSP IP/OBS DSCHRG MGMT >30: CPT | Performed by: INTERNAL MEDICINE

## 2023-11-18 RX ORDER — HYDROCODONE BITARTRATE AND ACETAMINOPHEN 5; 325 MG/1; MG/1
1 TABLET ORAL EVERY 6 HOURS PRN
Qty: 15 TABLET | Refills: 0 | Status: SHIPPED | OUTPATIENT
Start: 2023-11-18

## 2023-11-18 RX ORDER — DOCUSATE SODIUM 100 MG/1
100 CAPSULE, LIQUID FILLED ORAL 2 TIMES DAILY
Qty: 20 CAPSULE | Refills: 0 | Status: SHIPPED | OUTPATIENT
Start: 2023-11-18

## 2023-11-18 NOTE — PLAN OF CARE
POD 2 Rt hip IM nailin, Pt is AAOX3-4, forgetful at times, VSS, room air, Aquacel and gauze dressing remain CDI, PO meds for pain, see MAR, Pt is TTWB up MAX with sit to stand or Sera Stedy, waffle cushion under Pt to offload sacrum, plan for discharge to [facility] today, Pt diong well, all needs met, all safety measures in place, call light within reach, will CTM.

## 2023-11-18 NOTE — PLAN OF CARE
NURSING DISCHARGE NOTE    Discharged Rehab facility via Wheelchair. Accompanied by Support staff  Belongings Taken by patient/family. AVS printed and discussed, IV removed, Rx for Norco given to Pt, paperwork given to [employer] , Pt ready to [facility].

## 2023-11-18 NOTE — PLAN OF CARE
Patient is disoriented to situation and alert & oriented x 3. Vitals stable on room air, SCD's BLE, & . Alert and oriented x 4. Denies pain. Denies numbness & tingling. Upper aquacel dressing is moderately saturated. Last BM 11/15/23. Tolerating regular diet. Voiding using purewick. Up moderate assist x2 TTWB. Plan to discharge to [facility]. Plan of care discussed with patient.

## 2023-11-18 NOTE — CM/SW NOTE
Notified pt is medically cleared for today. [facility] can accept pt today. Attempted to contact pt in the room, to discuss medical transport. Pt unavailable. Pt's son Jalen Bennett contacted. He unfortunately is ill, and unable to come in/ transport pt. Associated cost discussed, pt's son verbalized understanding. Daryl Casarez transport secured for 12:30 pm. PCS form completed and available for RN. RN to call report to receiving RN at [facility] Phone [phone]       CM/SW will remain available for DC planning and/or support.      Haydee De Oliveira, ESTEPHANIEN, VIA [facility]    [id number]

## 2023-11-20 ENCOUNTER — NURSING HOME VISIT (OUTPATIENT)
Dept: POST ACUTE CARE | Age: 88
End: 2023-11-20

## 2023-11-20 VITALS
TEMPERATURE: 97.8 F | OXYGEN SATURATION: 95 % | DIASTOLIC BLOOD PRESSURE: 59 MMHG | SYSTOLIC BLOOD PRESSURE: 122 MMHG | RESPIRATION RATE: 18 BRPM | HEART RATE: 82 BPM

## 2023-11-20 DIAGNOSIS — S72.001D CLOSED FRACTURE OF RIGHT HIP WITH ROUTINE HEALING, SUBSEQUENT ENCOUNTER: Primary | ICD-10-CM

## 2023-11-20 DIAGNOSIS — Z71.89 ACP (ADVANCE CARE PLANNING): ICD-10-CM

## 2023-11-20 DIAGNOSIS — R53.81 DEBILITY: ICD-10-CM

## 2023-11-20 DIAGNOSIS — E03.9 ACQUIRED HYPOTHYROIDISM: ICD-10-CM

## 2023-11-20 DIAGNOSIS — M80.00XD AGE-RELATED OSTEOPOROSIS WITH CURRENT PATHOLOGICAL FRACTURE WITH ROUTINE HEALING, SUBSEQUENT ENCOUNTER: ICD-10-CM

## 2023-11-20 PROBLEM — Z00.00 ENCOUNTER FOR MEDICARE ANNUAL WELLNESS EXAM: Status: RESOLVED | Noted: 2022-12-10 | Resolved: 2023-11-20

## 2023-11-20 PROBLEM — S72.001A CLOSED FRACTURE OF RIGHT HIP (CMD): Status: ACTIVE | Noted: 2023-11-16

## 2023-11-20 PROCEDURE — 1158F ADVNC CARE PLAN TLK DOCD: CPT | Performed by: FAMILY MEDICINE

## 2023-11-20 PROCEDURE — 1125F AMNT PAIN NOTED PAIN PRSNT: CPT | Performed by: FAMILY MEDICINE

## 2023-11-20 PROCEDURE — 99497 ADVNCD CARE PLAN 30 MIN: CPT | Performed by: FAMILY MEDICINE

## 2023-11-20 PROCEDURE — 1157F ADVNC CARE PLAN IN RCRD: CPT | Performed by: FAMILY MEDICINE

## 2023-11-20 PROCEDURE — 1160F RVW MEDS BY RX/DR IN RCRD: CPT | Performed by: FAMILY MEDICINE

## 2023-11-20 PROCEDURE — 1170F FXNL STATUS ASSESSED: CPT | Performed by: FAMILY MEDICINE

## 2023-11-20 PROCEDURE — 99305 1ST NF CARE MODERATE MDM 35: CPT | Performed by: FAMILY MEDICINE

## 2023-11-20 RX ORDER — PSEUDOEPHEDRINE HCL 30 MG
100 TABLET ORAL
COMMUNITY
Start: 2023-11-18

## 2023-11-20 RX ORDER — HYDROCODONE BITARTRATE AND ACETAMINOPHEN 5; 325 MG/1; MG/1
1 TABLET ORAL EVERY 6 HOURS PRN
COMMUNITY
Start: 2023-11-18

## 2023-11-20 RX ORDER — APIXABAN 2.5 MG/1
TABLET, FILM COATED ORAL
COMMUNITY
Start: 2023-11-18 | End: 2023-12-03 | Stop reason: ALTCHOICE

## 2023-11-20 ASSESSMENT — ENCOUNTER SYMPTOMS
ACTIVITY CHANGE: 1
ABDOMINAL DISTENTION: 0
WHEEZING: 0
NAUSEA: 0
AGITATION: 0
DIARRHEA: 0
SHORTNESS OF BREATH: 0
APPETITE CHANGE: 0
UNEXPECTED WEIGHT CHANGE: 0
CHEST TIGHTNESS: 0
CONSTIPATION: 1
TROUBLE SWALLOWING: 0
FEVER: 0
DIZZINESS: 0
WEAKNESS: 0

## 2023-11-22 ENCOUNTER — NURSING HOME VISIT (OUTPATIENT)
Dept: POST ACUTE CARE | Age: 88
End: 2023-11-22

## 2023-11-22 DIAGNOSIS — R53.81 DEBILITY: ICD-10-CM

## 2023-11-22 DIAGNOSIS — M80.00XD AGE-RELATED OSTEOPOROSIS WITH CURRENT PATHOLOGICAL FRACTURE WITH ROUTINE HEALING, SUBSEQUENT ENCOUNTER: ICD-10-CM

## 2023-11-22 DIAGNOSIS — S72.001D CLOSED FRACTURE OF RIGHT HIP WITH ROUTINE HEALING, SUBSEQUENT ENCOUNTER: Primary | ICD-10-CM

## 2023-11-22 DIAGNOSIS — E03.9 ACQUIRED HYPOTHYROIDISM: ICD-10-CM

## 2023-11-22 PROCEDURE — 99497 ADVNCD CARE PLAN 30 MIN: CPT

## 2023-11-22 PROCEDURE — 99310 SBSQ NF CARE HIGH MDM 45: CPT

## 2023-11-22 PROCEDURE — 1160F RVW MEDS BY RX/DR IN RCRD: CPT

## 2023-11-22 PROCEDURE — 1158F ADVNC CARE PLAN TLK DOCD: CPT

## 2023-11-22 PROCEDURE — 1125F AMNT PAIN NOTED PAIN PRSNT: CPT

## 2023-11-22 ASSESSMENT — PAIN SCALES - GENERAL: PAINLEVEL: 1

## 2023-11-22 NOTE — CDS QUERY
[facility]  Dear Dr. Eduard Medina,  Clinical information (provided below) includes a hemoglobin trend of 13.2 10.4* 9.9* 9.6* and a diagnosis of Anemia.    PLEASE FURTHER SPECIFY THE DIAGNOSIS OF ANEMIA:    [ x   ] Postoperative Anemia due to Acute Blood Loss  [    ] Other (please specify): __________________________________      Documentation from the Medical Record:   RISK FACTORS: s/p Intramedullary fixation of right femur on 11/16/23  CLINICAL INDICATORS:   -11/14-11/18/23:  HGB 13.2 10.4* 9.9* 9.6*  -11/16 OP note: Estimated Blood Loss:  100 mL    -11/18 Discharge Summary:   Discharge Diagnosis: # expected post op anemia      TREATMENT:  IV fluids - Serial labs             For questions regarding this query, contact Clinical Documentation : Alexsandra Alcantar [phone]                           THIS FORM IS A PERMANENT PART OF THE MEDICAL RECORD

## 2023-11-24 ENCOUNTER — TELEPHONE (OUTPATIENT)
Dept: POST ACUTE CARE | Age: 88
End: 2023-11-24

## 2023-11-24 ENCOUNTER — NURSING HOME VISIT (OUTPATIENT)
Dept: POST ACUTE CARE | Age: 88
End: 2023-11-24

## 2023-11-24 VITALS
OXYGEN SATURATION: 95 % | HEART RATE: 77 BPM | TEMPERATURE: 98.1 F | SYSTOLIC BLOOD PRESSURE: 125 MMHG | DIASTOLIC BLOOD PRESSURE: 66 MMHG | RESPIRATION RATE: 18 BRPM

## 2023-11-24 VITALS
SYSTOLIC BLOOD PRESSURE: 116 MMHG | TEMPERATURE: 97.8 F | RESPIRATION RATE: 18 BRPM | DIASTOLIC BLOOD PRESSURE: 58 MMHG | HEART RATE: 80 BPM | OXYGEN SATURATION: 97 %

## 2023-11-24 DIAGNOSIS — R53.81 DEBILITY: ICD-10-CM

## 2023-11-24 DIAGNOSIS — E03.9 ACQUIRED HYPOTHYROIDISM: ICD-10-CM

## 2023-11-24 DIAGNOSIS — R05.1 ACUTE COUGH: ICD-10-CM

## 2023-11-24 DIAGNOSIS — S72.001D CLOSED FRACTURE OF RIGHT HIP WITH ROUTINE HEALING, SUBSEQUENT ENCOUNTER: Primary | ICD-10-CM

## 2023-11-24 DIAGNOSIS — G31.84 MCI (MILD COGNITIVE IMPAIRMENT) WITH MEMORY LOSS: ICD-10-CM

## 2023-11-24 PROCEDURE — 99309 SBSQ NF CARE MODERATE MDM 30: CPT | Performed by: FAMILY MEDICINE

## 2023-11-24 ASSESSMENT — ENCOUNTER SYMPTOMS
APPETITE CHANGE: 0
DIZZINESS: 0
DIARRHEA: 0
ABDOMINAL PAIN: 0
DIZZINESS: 0
WHEEZING: 0
CONSTIPATION: 0
CHEST TIGHTNESS: 0
SHORTNESS OF BREATH: 0
COUGH: 1
FEVER: 0
CONSTIPATION: 0
TROUBLE SWALLOWING: 0
NAUSEA: 0
EYE PAIN: 0
NUMBNESS: 0
VOMITING: 0
EYE DISCHARGE: 0
WEAKNESS: 1
UNEXPECTED WEIGHT CHANGE: 0
APPETITE CHANGE: 0
CONFUSION: 0
CHEST TIGHTNESS: 0
COUGH: 0
SHORTNESS OF BREATH: 0
ABDOMINAL DISTENTION: 0
WEAKNESS: 0
UNEXPECTED WEIGHT CHANGE: 0
ACTIVITY CHANGE: 1
FEVER: 0
POLYPHAGIA: 0
AGITATION: 0
AGITATION: 0
HEADACHES: 0
NAUSEA: 0
DIARRHEA: 0
POLYDIPSIA: 0
EYE ITCHING: 0
ACTIVITY CHANGE: 1
TROUBLE SWALLOWING: 0

## 2023-11-27 ENCOUNTER — NURSING HOME VISIT (OUTPATIENT)
Dept: POST ACUTE CARE | Age: 88
End: 2023-11-27

## 2023-11-27 VITALS
OXYGEN SATURATION: 94 % | TEMPERATURE: 97.8 F | SYSTOLIC BLOOD PRESSURE: 132 MMHG | HEART RATE: 63 BPM | DIASTOLIC BLOOD PRESSURE: 62 MMHG | RESPIRATION RATE: 18 BRPM

## 2023-11-27 DIAGNOSIS — R53.81 DEBILITY: ICD-10-CM

## 2023-11-27 DIAGNOSIS — E03.9 ACQUIRED HYPOTHYROIDISM: ICD-10-CM

## 2023-11-27 DIAGNOSIS — G31.84 MCI (MILD COGNITIVE IMPAIRMENT) WITH MEMORY LOSS: ICD-10-CM

## 2023-11-27 DIAGNOSIS — T81.49XA INFECTED SURGICAL WOUND: ICD-10-CM

## 2023-11-27 DIAGNOSIS — S72.001D CLOSED FRACTURE OF RIGHT HIP WITH ROUTINE HEALING, SUBSEQUENT ENCOUNTER: Primary | ICD-10-CM

## 2023-11-27 DIAGNOSIS — U07.1 COVID-19 VIRUS INFECTION: ICD-10-CM

## 2023-11-27 DIAGNOSIS — M80.00XD AGE-RELATED OSTEOPOROSIS WITH CURRENT PATHOLOGICAL FRACTURE WITH ROUTINE HEALING, SUBSEQUENT ENCOUNTER: ICD-10-CM

## 2023-11-27 PROCEDURE — 99310 SBSQ NF CARE HIGH MDM 45: CPT | Performed by: FAMILY MEDICINE

## 2023-11-27 ASSESSMENT — ENCOUNTER SYMPTOMS
UNEXPECTED WEIGHT CHANGE: 0
SHORTNESS OF BREATH: 0
CONSTIPATION: 0
COUGH: 1
APPETITE CHANGE: 0
TROUBLE SWALLOWING: 0
FEVER: 0
WEAKNESS: 0
DIZZINESS: 0
ACTIVITY CHANGE: 1
NAUSEA: 0
ABDOMINAL DISTENTION: 0
DIARRHEA: 0
CHEST TIGHTNESS: 0
AGITATION: 0
WHEEZING: 0

## 2023-11-28 ENCOUNTER — NURSING HOME VISIT (OUTPATIENT)
Dept: POST ACUTE CARE | Age: 88
End: 2023-11-28

## 2023-11-28 VITALS
HEART RATE: 73 BPM | SYSTOLIC BLOOD PRESSURE: 132 MMHG | OXYGEN SATURATION: 94 % | TEMPERATURE: 98.3 F | DIASTOLIC BLOOD PRESSURE: 70 MMHG | RESPIRATION RATE: 18 BRPM

## 2023-11-28 DIAGNOSIS — T81.49XA INFECTED SURGICAL WOUND: ICD-10-CM

## 2023-11-28 DIAGNOSIS — M80.00XD AGE-RELATED OSTEOPOROSIS WITH CURRENT PATHOLOGICAL FRACTURE WITH ROUTINE HEALING, SUBSEQUENT ENCOUNTER: ICD-10-CM

## 2023-11-28 DIAGNOSIS — R53.81 DEBILITY: ICD-10-CM

## 2023-11-28 DIAGNOSIS — S72.001D CLOSED FRACTURE OF RIGHT HIP WITH ROUTINE HEALING, SUBSEQUENT ENCOUNTER: Primary | ICD-10-CM

## 2023-11-28 DIAGNOSIS — E03.9 ACQUIRED HYPOTHYROIDISM: ICD-10-CM

## 2023-11-28 DIAGNOSIS — U07.1 COVID-19 VIRUS INFECTION: ICD-10-CM

## 2023-11-28 PROCEDURE — 1126F AMNT PAIN NOTED NONE PRSNT: CPT

## 2023-11-28 PROCEDURE — 99308 SBSQ NF CARE LOW MDM 20: CPT

## 2023-11-28 ASSESSMENT — ENCOUNTER SYMPTOMS
ABDOMINAL PAIN: 0
ACTIVITY CHANGE: 1
SHORTNESS OF BREATH: 0
VOMITING: 0
CONSTIPATION: 0
COUGH: 0
DIZZINESS: 0
TROUBLE SWALLOWING: 0
CONFUSION: 0
FEVER: 0
NUMBNESS: 0
CHEST TIGHTNESS: 0
UNEXPECTED WEIGHT CHANGE: 0
NAUSEA: 0
APPETITE CHANGE: 0
DIARRHEA: 0
WEAKNESS: 1
HEADACHES: 0
AGITATION: 0

## 2023-11-28 ASSESSMENT — PAIN SCALES - GENERAL: PAINLEVEL: 0

## 2023-11-29 ENCOUNTER — NURSING HOME VISIT (OUTPATIENT)
Dept: POST ACUTE CARE | Age: 88
End: 2023-11-29

## 2023-11-29 VITALS
HEART RATE: 77 BPM | OXYGEN SATURATION: 94 % | TEMPERATURE: 97.2 F | DIASTOLIC BLOOD PRESSURE: 82 MMHG | SYSTOLIC BLOOD PRESSURE: 129 MMHG | RESPIRATION RATE: 18 BRPM

## 2023-11-29 DIAGNOSIS — S72.001D CLOSED FRACTURE OF RIGHT HIP WITH ROUTINE HEALING, SUBSEQUENT ENCOUNTER: Primary | ICD-10-CM

## 2023-11-29 DIAGNOSIS — T81.49XA INFECTED SURGICAL WOUND: ICD-10-CM

## 2023-11-29 DIAGNOSIS — U07.1 COVID-19 VIRUS INFECTION: ICD-10-CM

## 2023-11-29 DIAGNOSIS — M80.00XD AGE-RELATED OSTEOPOROSIS WITH CURRENT PATHOLOGICAL FRACTURE WITH ROUTINE HEALING, SUBSEQUENT ENCOUNTER: ICD-10-CM

## 2023-11-29 DIAGNOSIS — G31.84 MCI (MILD COGNITIVE IMPAIRMENT) WITH MEMORY LOSS: ICD-10-CM

## 2023-11-29 DIAGNOSIS — E03.9 ACQUIRED HYPOTHYROIDISM: ICD-10-CM

## 2023-11-29 DIAGNOSIS — R53.81 DEBILITY: ICD-10-CM

## 2023-11-29 PROCEDURE — 99308 SBSQ NF CARE LOW MDM 20: CPT

## 2023-11-29 PROCEDURE — 1170F FXNL STATUS ASSESSED: CPT

## 2023-11-29 PROCEDURE — 1126F AMNT PAIN NOTED NONE PRSNT: CPT

## 2023-11-29 PROCEDURE — 99497 ADVNCD CARE PLAN 30 MIN: CPT

## 2023-11-29 ASSESSMENT — ENCOUNTER SYMPTOMS
COUGH: 0
NUMBNESS: 0
SHORTNESS OF BREATH: 0
NAUSEA: 0
CONSTIPATION: 0
DIZZINESS: 0
HEADACHES: 0
FEVER: 0
ABDOMINAL PAIN: 0
CHEST TIGHTNESS: 0
AGITATION: 0
UNEXPECTED WEIGHT CHANGE: 0
DIARRHEA: 0
WEAKNESS: 1
APPETITE CHANGE: 0
TROUBLE SWALLOWING: 0
VOMITING: 0
CONFUSION: 0
ACTIVITY CHANGE: 1

## 2023-11-29 ASSESSMENT — PAIN SCALES - GENERAL: PAINLEVEL: 0

## 2023-12-01 ENCOUNTER — NURSING HOME VISIT (OUTPATIENT)
Dept: POST ACUTE CARE | Age: 88
End: 2023-12-01

## 2023-12-01 DIAGNOSIS — S72.001D CLOSED FRACTURE OF RIGHT HIP WITH ROUTINE HEALING, SUBSEQUENT ENCOUNTER: Primary | ICD-10-CM

## 2023-12-01 DIAGNOSIS — G31.84 MCI (MILD COGNITIVE IMPAIRMENT) WITH MEMORY LOSS: ICD-10-CM

## 2023-12-01 DIAGNOSIS — M80.00XD AGE-RELATED OSTEOPOROSIS WITH CURRENT PATHOLOGICAL FRACTURE WITH ROUTINE HEALING, SUBSEQUENT ENCOUNTER: ICD-10-CM

## 2023-12-01 DIAGNOSIS — T81.49XA INFECTED SURGICAL WOUND: ICD-10-CM

## 2023-12-01 DIAGNOSIS — E03.9 ACQUIRED HYPOTHYROIDISM: ICD-10-CM

## 2023-12-01 DIAGNOSIS — R53.81 DEBILITY: ICD-10-CM

## 2023-12-01 DIAGNOSIS — U07.1 COVID-19 VIRUS INFECTION: ICD-10-CM

## 2023-12-01 PROCEDURE — 1126F AMNT PAIN NOTED NONE PRSNT: CPT

## 2023-12-01 PROCEDURE — 99308 SBSQ NF CARE LOW MDM 20: CPT

## 2023-12-01 PROCEDURE — 1160F RVW MEDS BY RX/DR IN RCRD: CPT

## 2023-12-01 ASSESSMENT — PAIN SCALES - GENERAL: PAINLEVEL: 0

## 2023-12-03 VITALS
OXYGEN SATURATION: 93 % | TEMPERATURE: 97 F | DIASTOLIC BLOOD PRESSURE: 62 MMHG | HEART RATE: 72 BPM | RESPIRATION RATE: 18 BRPM | SYSTOLIC BLOOD PRESSURE: 146 MMHG

## 2023-12-03 RX ORDER — ASPIRIN 81 MG/1
81 TABLET ORAL DAILY
COMMUNITY

## 2023-12-03 ASSESSMENT — ENCOUNTER SYMPTOMS
UNEXPECTED WEIGHT CHANGE: 0
CONSTIPATION: 0
NUMBNESS: 0
VOMITING: 0
COUGH: 0
ABDOMINAL PAIN: 0
HEADACHES: 0
ACTIVITY CHANGE: 1
FEVER: 0
NAUSEA: 0
SHORTNESS OF BREATH: 0
AGITATION: 0
WOUND: 1
CHEST TIGHTNESS: 0
DIZZINESS: 0
TROUBLE SWALLOWING: 0
CONFUSION: 0
APPETITE CHANGE: 0
WEAKNESS: 1
DIARRHEA: 0

## 2023-12-04 ENCOUNTER — NURSING HOME VISIT (OUTPATIENT)
Dept: POST ACUTE CARE | Age: 88
End: 2023-12-04

## 2023-12-04 VITALS
SYSTOLIC BLOOD PRESSURE: 119 MMHG | OXYGEN SATURATION: 97 % | TEMPERATURE: 97.5 F | HEART RATE: 79 BPM | DIASTOLIC BLOOD PRESSURE: 59 MMHG | RESPIRATION RATE: 18 BRPM

## 2023-12-04 DIAGNOSIS — E03.9 ACQUIRED HYPOTHYROIDISM: ICD-10-CM

## 2023-12-04 DIAGNOSIS — R53.81 DEBILITY: ICD-10-CM

## 2023-12-04 DIAGNOSIS — M80.00XD AGE-RELATED OSTEOPOROSIS WITH CURRENT PATHOLOGICAL FRACTURE WITH ROUTINE HEALING, SUBSEQUENT ENCOUNTER: ICD-10-CM

## 2023-12-04 DIAGNOSIS — S72.001D CLOSED FRACTURE OF RIGHT HIP WITH ROUTINE HEALING, SUBSEQUENT ENCOUNTER: Primary | ICD-10-CM

## 2023-12-04 DIAGNOSIS — G31.84 MCI (MILD COGNITIVE IMPAIRMENT) WITH MEMORY LOSS: ICD-10-CM

## 2023-12-04 PROCEDURE — 99310 SBSQ NF CARE HIGH MDM 45: CPT | Performed by: FAMILY MEDICINE

## 2023-12-04 ASSESSMENT — ENCOUNTER SYMPTOMS
SHORTNESS OF BREATH: 0
TROUBLE SWALLOWING: 0
ACTIVITY CHANGE: 1
DIARRHEA: 0
WEAKNESS: 0
FEVER: 0
CHEST TIGHTNESS: 0
DIZZINESS: 0
NAUSEA: 0
APPETITE CHANGE: 0
ABDOMINAL DISTENTION: 0
AGITATION: 0
WHEEZING: 0
UNEXPECTED WEIGHT CHANGE: 0
CONSTIPATION: 0

## 2023-12-05 ENCOUNTER — NURSING HOME VISIT (OUTPATIENT)
Dept: POST ACUTE CARE | Age: 88
End: 2023-12-05

## 2023-12-05 VITALS
SYSTOLIC BLOOD PRESSURE: 128 MMHG | OXYGEN SATURATION: 94 % | TEMPERATURE: 97.4 F | HEART RATE: 82 BPM | WEIGHT: 126.4 LBS | RESPIRATION RATE: 18 BRPM | BODY MASS INDEX: 21.7 KG/M2 | DIASTOLIC BLOOD PRESSURE: 70 MMHG

## 2023-12-05 DIAGNOSIS — M80.00XD AGE-RELATED OSTEOPOROSIS WITH CURRENT PATHOLOGICAL FRACTURE WITH ROUTINE HEALING, SUBSEQUENT ENCOUNTER: Primary | ICD-10-CM

## 2023-12-05 DIAGNOSIS — G31.84 MCI (MILD COGNITIVE IMPAIRMENT) WITH MEMORY LOSS: ICD-10-CM

## 2023-12-05 DIAGNOSIS — R53.81 DEBILITY: ICD-10-CM

## 2023-12-05 PROCEDURE — 1170F FXNL STATUS ASSESSED: CPT

## 2023-12-05 PROCEDURE — 99309 SBSQ NF CARE MODERATE MDM 30: CPT

## 2023-12-05 PROCEDURE — 1160F RVW MEDS BY RX/DR IN RCRD: CPT

## 2023-12-05 PROCEDURE — 1158F ADVNC CARE PLAN TLK DOCD: CPT

## 2023-12-05 PROCEDURE — 1157F ADVNC CARE PLAN IN RCRD: CPT

## 2023-12-05 PROCEDURE — 1126F AMNT PAIN NOTED NONE PRSNT: CPT

## 2023-12-05 ASSESSMENT — ENCOUNTER SYMPTOMS
CONFUSION: 0
APPETITE CHANGE: 0
NAUSEA: 0
DIARRHEA: 0
HEADACHES: 0
WEAKNESS: 1
DIZZINESS: 0
UNEXPECTED WEIGHT CHANGE: 0
FEVER: 0
COUGH: 0
CHEST TIGHTNESS: 0
VOMITING: 0
ACTIVITY CHANGE: 1
AGITATION: 0
WOUND: 1
NUMBNESS: 0
CONSTIPATION: 0
ABDOMINAL PAIN: 0
TROUBLE SWALLOWING: 0
SHORTNESS OF BREATH: 0

## 2023-12-08 ENCOUNTER — NURSING HOME VISIT (OUTPATIENT)
Dept: POST ACUTE CARE | Age: 88
End: 2023-12-08

## 2023-12-08 DIAGNOSIS — M80.00XD AGE-RELATED OSTEOPOROSIS WITH CURRENT PATHOLOGICAL FRACTURE WITH ROUTINE HEALING, SUBSEQUENT ENCOUNTER: ICD-10-CM

## 2023-12-08 DIAGNOSIS — G31.84 MCI (MILD COGNITIVE IMPAIRMENT) WITH MEMORY LOSS: ICD-10-CM

## 2023-12-08 DIAGNOSIS — E03.9 ACQUIRED HYPOTHYROIDISM: ICD-10-CM

## 2023-12-08 DIAGNOSIS — S72.001D CLOSED FRACTURE OF RIGHT HIP WITH ROUTINE HEALING, SUBSEQUENT ENCOUNTER: Primary | ICD-10-CM

## 2023-12-08 DIAGNOSIS — R53.81 DEBILITY: ICD-10-CM

## 2023-12-08 DIAGNOSIS — U07.1 COVID-19 VIRUS INFECTION: ICD-10-CM

## 2023-12-08 PROCEDURE — 99308 SBSQ NF CARE LOW MDM 20: CPT

## 2023-12-08 PROCEDURE — 1126F AMNT PAIN NOTED NONE PRSNT: CPT

## 2023-12-08 ASSESSMENT — PAIN SCALES - GENERAL: PAINLEVEL: 0

## 2023-12-10 VITALS
TEMPERATURE: 97 F | HEART RATE: 76 BPM | DIASTOLIC BLOOD PRESSURE: 59 MMHG | SYSTOLIC BLOOD PRESSURE: 131 MMHG | RESPIRATION RATE: 18 BRPM | OXYGEN SATURATION: 97 %

## 2023-12-10 ASSESSMENT — ENCOUNTER SYMPTOMS
TROUBLE SWALLOWING: 0
APPETITE CHANGE: 0
FEVER: 0
CONFUSION: 0
SHORTNESS OF BREATH: 0
HEADACHES: 0
DIZZINESS: 0
AGITATION: 0
ABDOMINAL PAIN: 0
NUMBNESS: 0
WEAKNESS: 1
UNEXPECTED WEIGHT CHANGE: 0
NAUSEA: 0
VOMITING: 0
CHEST TIGHTNESS: 0
WOUND: 1
CONSTIPATION: 0
DIARRHEA: 0
COUGH: 0
ACTIVITY CHANGE: 1

## 2023-12-11 ENCOUNTER — NURSING HOME VISIT (OUTPATIENT)
Dept: POST ACUTE CARE | Age: 88
End: 2023-12-11

## 2023-12-11 VITALS
DIASTOLIC BLOOD PRESSURE: 79 MMHG | OXYGEN SATURATION: 96 % | TEMPERATURE: 97.3 F | RESPIRATION RATE: 17 BRPM | HEART RATE: 74 BPM | SYSTOLIC BLOOD PRESSURE: 144 MMHG

## 2023-12-11 DIAGNOSIS — M80.00XD AGE-RELATED OSTEOPOROSIS WITH CURRENT PATHOLOGICAL FRACTURE WITH ROUTINE HEALING, SUBSEQUENT ENCOUNTER: ICD-10-CM

## 2023-12-11 DIAGNOSIS — R53.81 DEBILITY: ICD-10-CM

## 2023-12-11 DIAGNOSIS — S72.001D CLOSED FRACTURE OF RIGHT HIP WITH ROUTINE HEALING, SUBSEQUENT ENCOUNTER: Primary | ICD-10-CM

## 2023-12-11 DIAGNOSIS — Z86.16 HISTORY OF COVID-19: ICD-10-CM

## 2023-12-11 DIAGNOSIS — E03.9 ACQUIRED HYPOTHYROIDISM: ICD-10-CM

## 2023-12-11 DIAGNOSIS — G31.84 MCI (MILD COGNITIVE IMPAIRMENT) WITH MEMORY LOSS: ICD-10-CM

## 2023-12-11 PROBLEM — T81.49XA INFECTED SURGICAL WOUND: Status: RESOLVED | Noted: 2023-11-28 | Resolved: 2023-12-11

## 2023-12-11 PROBLEM — U07.1 COVID-19 VIRUS INFECTION: Status: RESOLVED | Noted: 2023-11-27 | Resolved: 2023-12-11

## 2023-12-11 PROCEDURE — 99310 SBSQ NF CARE HIGH MDM 45: CPT | Performed by: FAMILY MEDICINE

## 2023-12-11 ASSESSMENT — ENCOUNTER SYMPTOMS
TROUBLE SWALLOWING: 0
WHEEZING: 0
UNEXPECTED WEIGHT CHANGE: 0
APPETITE CHANGE: 0
CONSTIPATION: 0
ABDOMINAL DISTENTION: 0
ACTIVITY CHANGE: 1
WEAKNESS: 0
AGITATION: 0
DIARRHEA: 0
FEVER: 0
SHORTNESS OF BREATH: 0
DIZZINESS: 0
CHEST TIGHTNESS: 0
NAUSEA: 0

## 2023-12-13 ENCOUNTER — NURSING HOME VISIT (OUTPATIENT)
Dept: POST ACUTE CARE | Age: 88
End: 2023-12-13

## 2023-12-13 DIAGNOSIS — R53.81 DEBILITY: ICD-10-CM

## 2023-12-13 DIAGNOSIS — E03.9 ACQUIRED HYPOTHYROIDISM: ICD-10-CM

## 2023-12-13 DIAGNOSIS — S72.001D CLOSED FRACTURE OF RIGHT HIP WITH ROUTINE HEALING, SUBSEQUENT ENCOUNTER: Primary | ICD-10-CM

## 2023-12-13 DIAGNOSIS — M80.00XD AGE-RELATED OSTEOPOROSIS WITH CURRENT PATHOLOGICAL FRACTURE WITH ROUTINE HEALING, SUBSEQUENT ENCOUNTER: ICD-10-CM

## 2023-12-13 DIAGNOSIS — Z86.16 HISTORY OF COVID-19: ICD-10-CM

## 2023-12-13 DIAGNOSIS — G31.84 MCI (MILD COGNITIVE IMPAIRMENT) WITH MEMORY LOSS: ICD-10-CM

## 2023-12-13 PROCEDURE — 1170F FXNL STATUS ASSESSED: CPT

## 2023-12-13 PROCEDURE — 99308 SBSQ NF CARE LOW MDM 20: CPT

## 2023-12-13 PROCEDURE — 1126F AMNT PAIN NOTED NONE PRSNT: CPT

## 2023-12-13 ASSESSMENT — PAIN SCALES - GENERAL: PAINLEVEL: 0

## 2023-12-15 VITALS
TEMPERATURE: 97.4 F | DIASTOLIC BLOOD PRESSURE: 73 MMHG | SYSTOLIC BLOOD PRESSURE: 118 MMHG | RESPIRATION RATE: 18 BRPM | HEART RATE: 74 BPM | OXYGEN SATURATION: 97 %

## 2023-12-15 ASSESSMENT — ENCOUNTER SYMPTOMS
AGITATION: 0
COUGH: 0
UNEXPECTED WEIGHT CHANGE: 0
NAUSEA: 0
FEVER: 0
SHORTNESS OF BREATH: 0
WOUND: 1
CONSTIPATION: 0
TROUBLE SWALLOWING: 0
DIZZINESS: 0
CONFUSION: 0
DIARRHEA: 0
NUMBNESS: 0
VOMITING: 0
ABDOMINAL PAIN: 0
CHEST TIGHTNESS: 0
WEAKNESS: 1
ACTIVITY CHANGE: 1
APPETITE CHANGE: 0
HEADACHES: 0

## 2023-12-18 ENCOUNTER — NURSING HOME VISIT (OUTPATIENT)
Dept: POST ACUTE CARE | Age: 88
End: 2023-12-18

## 2023-12-18 VITALS
DIASTOLIC BLOOD PRESSURE: 69 MMHG | TEMPERATURE: 97.9 F | OXYGEN SATURATION: 97 % | SYSTOLIC BLOOD PRESSURE: 116 MMHG | RESPIRATION RATE: 18 BRPM | HEART RATE: 66 BPM

## 2023-12-18 DIAGNOSIS — G31.84 MCI (MILD COGNITIVE IMPAIRMENT) WITH MEMORY LOSS: ICD-10-CM

## 2023-12-18 DIAGNOSIS — S72.001D CLOSED FRACTURE OF RIGHT HIP WITH ROUTINE HEALING, SUBSEQUENT ENCOUNTER: Primary | ICD-10-CM

## 2023-12-18 DIAGNOSIS — E03.9 ACQUIRED HYPOTHYROIDISM: ICD-10-CM

## 2023-12-18 DIAGNOSIS — Z86.16 HISTORY OF COVID-19: ICD-10-CM

## 2023-12-18 DIAGNOSIS — R53.81 DEBILITY: ICD-10-CM

## 2023-12-18 DIAGNOSIS — M80.00XD AGE-RELATED OSTEOPOROSIS WITH CURRENT PATHOLOGICAL FRACTURE WITH ROUTINE HEALING, SUBSEQUENT ENCOUNTER: ICD-10-CM

## 2023-12-18 PROCEDURE — 99310 SBSQ NF CARE HIGH MDM 45: CPT | Performed by: FAMILY MEDICINE

## 2023-12-18 ASSESSMENT — ENCOUNTER SYMPTOMS
CHEST TIGHTNESS: 0
AGITATION: 0
ABDOMINAL DISTENTION: 0
TROUBLE SWALLOWING: 0
NAUSEA: 0
SHORTNESS OF BREATH: 0
APPETITE CHANGE: 0
DIZZINESS: 0
WHEEZING: 0
UNEXPECTED WEIGHT CHANGE: 0
DIARRHEA: 0
CONSTIPATION: 0
FEVER: 0
ACTIVITY CHANGE: 1
WEAKNESS: 0

## 2023-12-19 ENCOUNTER — NURSING HOME VISIT (OUTPATIENT)
Dept: POST ACUTE CARE | Age: 88
End: 2023-12-19

## 2023-12-19 VITALS
OXYGEN SATURATION: 97 % | SYSTOLIC BLOOD PRESSURE: 125 MMHG | DIASTOLIC BLOOD PRESSURE: 72 MMHG | WEIGHT: 112 LBS | RESPIRATION RATE: 18 BRPM | BODY MASS INDEX: 19.22 KG/M2 | HEART RATE: 68 BPM | TEMPERATURE: 97.9 F

## 2023-12-19 DIAGNOSIS — Z86.16 HISTORY OF COVID-19: ICD-10-CM

## 2023-12-19 DIAGNOSIS — R53.81 DEBILITY: ICD-10-CM

## 2023-12-19 DIAGNOSIS — S72.001D CLOSED FRACTURE OF RIGHT HIP WITH ROUTINE HEALING, SUBSEQUENT ENCOUNTER: Primary | ICD-10-CM

## 2023-12-19 DIAGNOSIS — E03.9 ACQUIRED HYPOTHYROIDISM: ICD-10-CM

## 2023-12-19 DIAGNOSIS — M80.00XD AGE-RELATED OSTEOPOROSIS WITH CURRENT PATHOLOGICAL FRACTURE WITH ROUTINE HEALING, SUBSEQUENT ENCOUNTER: ICD-10-CM

## 2023-12-19 DIAGNOSIS — G31.84 MCI (MILD COGNITIVE IMPAIRMENT) WITH MEMORY LOSS: ICD-10-CM

## 2023-12-19 PROCEDURE — 99316 NF DSCHRG MGMT 30 MIN+: CPT

## 2023-12-19 PROCEDURE — 1170F FXNL STATUS ASSESSED: CPT

## 2023-12-19 PROCEDURE — 1126F AMNT PAIN NOTED NONE PRSNT: CPT

## 2023-12-19 PROCEDURE — 1160F RVW MEDS BY RX/DR IN RCRD: CPT

## 2023-12-19 RX ORDER — ACETAMINOPHEN 325 MG/1
650 TABLET ORAL EVERY 6 HOURS PRN
COMMUNITY

## 2023-12-19 ASSESSMENT — ENCOUNTER SYMPTOMS
FEVER: 0
CONSTIPATION: 0
TROUBLE SWALLOWING: 0
CHEST TIGHTNESS: 0
WOUND: 1
APPETITE CHANGE: 0
UNEXPECTED WEIGHT CHANGE: 0
VOMITING: 0
NUMBNESS: 0
COUGH: 0
CONFUSION: 0
DIZZINESS: 0
ACTIVITY CHANGE: 1
SHORTNESS OF BREATH: 0
WEAKNESS: 1
NAUSEA: 0
AGITATION: 0
ABDOMINAL PAIN: 0
HEADACHES: 0
DIARRHEA: 0

## 2023-12-19 ASSESSMENT — PAIN SCALES - GENERAL: PAINLEVEL: 0

## 2023-12-22 ENCOUNTER — CASE MANAGEMENT (OUTPATIENT)
Dept: CARE COORDINATION | Age: 88
End: 2023-12-22

## 2023-12-27 ENCOUNTER — CASE MANAGEMENT (OUTPATIENT)
Dept: CARE COORDINATION | Age: 88
End: 2023-12-27

## 2023-12-28 ENCOUNTER — CASE MANAGEMENT (OUTPATIENT)
Dept: CARE COORDINATION | Age: 88
End: 2023-12-28

## 2024-01-01 ENCOUNTER — APPOINTMENT (OUTPATIENT)
Dept: GENERAL RADIOLOGY | Facility: HOSPITAL | Age: 89
End: 2024-01-01
Attending: EMERGENCY MEDICINE
Payer: MEDICARE

## 2024-01-01 ENCOUNTER — APPOINTMENT (OUTPATIENT)
Dept: GENERAL RADIOLOGY | Facility: HOSPITAL | Age: 89
End: 2024-01-01
Attending: INTERNAL MEDICINE
Payer: MEDICARE

## 2024-01-01 ENCOUNTER — APPOINTMENT (OUTPATIENT)
Dept: CV DIAGNOSTICS | Facility: HOSPITAL | Age: 89
End: 2024-01-01
Attending: NURSE PRACTITIONER
Payer: MEDICARE

## 2024-01-01 ENCOUNTER — APPOINTMENT (OUTPATIENT)
Dept: CT IMAGING | Facility: HOSPITAL | Age: 89
End: 2024-01-01
Attending: EMERGENCY MEDICINE
Payer: MEDICARE

## 2024-01-01 ENCOUNTER — HOSPITAL ENCOUNTER (INPATIENT)
Facility: HOSPITAL | Age: 89
LOS: 1 days | End: 2024-01-01
Attending: EMERGENCY MEDICINE | Admitting: HOSPITALIST
Payer: MEDICARE

## 2024-01-01 ENCOUNTER — APPOINTMENT (OUTPATIENT)
Dept: CT IMAGING | Facility: HOSPITAL | Age: 89
End: 2024-01-01
Attending: INTERNAL MEDICINE
Payer: MEDICARE

## 2024-01-01 VITALS
WEIGHT: 102.5 LBS | OXYGEN SATURATION: 93 % | HEART RATE: 54 BPM | HEIGHT: 63 IN | SYSTOLIC BLOOD PRESSURE: 119 MMHG | DIASTOLIC BLOOD PRESSURE: 44 MMHG | TEMPERATURE: 97 F | BODY MASS INDEX: 18.16 KG/M2 | RESPIRATION RATE: 20 BRPM

## 2024-01-01 DIAGNOSIS — L03.119 CELLULITIS OF UPPER EXTREMITY, UNSPECIFIED LATERALITY: ICD-10-CM

## 2024-01-01 DIAGNOSIS — I95.9 HYPOTENSION, UNSPECIFIED HYPOTENSION TYPE: ICD-10-CM

## 2024-01-01 DIAGNOSIS — R41.82 ALTERED MENTAL STATUS, UNSPECIFIED ALTERED MENTAL STATUS TYPE: ICD-10-CM

## 2024-01-01 DIAGNOSIS — R00.1 BRADYCARDIA: ICD-10-CM

## 2024-01-01 DIAGNOSIS — R49.21 HYPERNASALITY: ICD-10-CM

## 2024-01-01 DIAGNOSIS — L89.90 PRESSURE INJURY OF SKIN, UNSPECIFIED INJURY STAGE, UNSPECIFIED LOCATION: ICD-10-CM

## 2024-01-01 DIAGNOSIS — E03.9 HYPOTHYROIDISM, UNSPECIFIED TYPE: ICD-10-CM

## 2024-01-01 DIAGNOSIS — N17.9 AKI (ACUTE KIDNEY INJURY) (HCC): ICD-10-CM

## 2024-01-01 DIAGNOSIS — T68.XXXA HYPOTHERMIA, INITIAL ENCOUNTER: Primary | ICD-10-CM

## 2024-01-01 LAB
ALBUMIN SERPL-MCNC: 2.9 G/DL (ref 3.2–4.8)
ALBUMIN SERPL-MCNC: 3.9 G/DL (ref 3.2–4.8)
ALBUMIN/GLOB SERPL: 1.3 {RATIO} (ref 1–2)
ALBUMIN/GLOB SERPL: 1.5 {RATIO} (ref 1–2)
ALP LIVER SERPL-CCNC: 151 U/L
ALP LIVER SERPL-CCNC: 182 U/L
ALT SERPL-CCNC: 130 U/L
ALT SERPL-CCNC: 98 U/L
AMMONIA PLAS-MCNC: 29 UMOL/L (ref 11–32)
ANION GAP SERPL CALC-SCNC: 12 MMOL/L (ref 0–18)
ANION GAP SERPL CALC-SCNC: 14 MMOL/L (ref 0–18)
ANION GAP SERPL CALC-SCNC: 15 MMOL/L (ref 0–18)
AST SERPL-CCNC: 166 U/L (ref ?–34)
AST SERPL-CCNC: 200 U/L (ref ?–34)
ATRIAL RATE: 50 BPM
BASE EXCESS BLDA CALC-SCNC: -5.1 MMOL/L (ref ?–2)
BASE EXCESS BLDA CALC-SCNC: -5.5 MMOL/L (ref ?–2)
BASOPHILS # BLD: 0 X10(3) UL (ref 0–0.2)
BASOPHILS # BLD: 0 X10(3) UL (ref 0–0.2)
BASOPHILS NFR BLD: 0 %
BASOPHILS NFR BLD: 0 %
BILIRUB SERPL-MCNC: 0.4 MG/DL (ref 0.2–0.9)
BILIRUB SERPL-MCNC: 0.4 MG/DL (ref 0.2–0.9)
BILIRUB UR QL STRIP.AUTO: NEGATIVE
BLACTX-M ISLT/SPM QL: NOT DETECTED
BODY TEMPERATURE: 98.6 F
BODY TEMPERATURE: 98.6 F
BUN BLD-MCNC: 54 MG/DL (ref 9–23)
BUN BLD-MCNC: 60 MG/DL (ref 9–23)
BUN BLD-MCNC: 68 MG/DL (ref 9–23)
C DIFF TOX B STL QL: NEGATIVE
CA-I BLD-SCNC: 1.02 MMOL/L (ref 0.95–1.32)
CA-I BLD-SCNC: 1.15 MMOL/L (ref 0.95–1.32)
CALCIUM BLD-MCNC: 7.6 MG/DL (ref 8.7–10.4)
CALCIUM BLD-MCNC: 8.1 MG/DL (ref 8.7–10.4)
CALCIUM BLD-MCNC: 9.6 MG/DL (ref 8.7–10.4)
CHLORIDE SERPL-SCNC: 113 MMOL/L (ref 98–112)
CHLORIDE SERPL-SCNC: 114 MMOL/L (ref 98–112)
CHLORIDE SERPL-SCNC: 116 MMOL/L (ref 98–112)
CK SERPL-CCNC: 781 U/L
CK SERPL-CCNC: 787 U/L
CLARITY UR REFRACT.AUTO: CLEAR
CO2 SERPL-SCNC: 18 MMOL/L (ref 21–32)
CO2 SERPL-SCNC: 21 MMOL/L (ref 21–32)
CO2 SERPL-SCNC: 24 MMOL/L (ref 21–32)
COHGB MFR BLD: 0.3 % SAT (ref 0–3)
COHGB MFR BLD: 0.7 % SAT (ref 0–3)
COLOR UR AUTO: YELLOW
CREAT BLD-MCNC: 1.66 MG/DL
CREAT BLD-MCNC: 1.7 MG/DL
CREAT BLD-MCNC: 1.71 MG/DL
EGFRCR SERPLBLD CKD-EPI 2021: 27 ML/MIN/1.73M2 (ref 60–?)
EGFRCR SERPLBLD CKD-EPI 2021: 27 ML/MIN/1.73M2 (ref 60–?)
EGFRCR SERPLBLD CKD-EPI 2021: 28 ML/MIN/1.73M2 (ref 60–?)
EOSINOPHIL # BLD: 0 X10(3) UL (ref 0–0.7)
EOSINOPHIL # BLD: 0 X10(3) UL (ref 0–0.7)
EOSINOPHIL NFR BLD: 0 %
EOSINOPHIL NFR BLD: 0 %
ERYTHROCYTE [DISTWIDTH] IN BLOOD BY AUTOMATED COUNT: 16.7 %
ERYTHROCYTE [DISTWIDTH] IN BLOOD BY AUTOMATED COUNT: 19 %
FIBRINOGEN PPP-MCNC: 387 MG/DL (ref 200–480)
FIO2: 21 %
GLOBULIN PLAS-MCNC: 1.9 G/DL (ref 2–3.5)
GLOBULIN PLAS-MCNC: 2.9 G/DL (ref 2–3.5)
GLUCOSE BLD-MCNC: 100 MG/DL (ref 70–99)
GLUCOSE BLD-MCNC: 148 MG/DL (ref 70–99)
GLUCOSE BLD-MCNC: 79 MG/DL (ref 70–99)
GLUCOSE BLD-MCNC: 86 MG/DL (ref 70–99)
GLUCOSE BLD-MCNC: 88 MG/DL (ref 70–99)
GLUCOSE UR STRIP.AUTO-MCNC: NORMAL MG/DL
HCO3 BLDA-SCNC: 20.6 MEQ/L (ref 21–27)
HCO3 BLDA-SCNC: 21 MEQ/L (ref 21–27)
HCT VFR BLD AUTO: 33.3 %
HCT VFR BLD AUTO: 46.4 %
HGB BLD-MCNC: 10.6 G/DL
HGB BLD-MCNC: 11 G/DL
HGB BLD-MCNC: 11.5 G/DL
HGB BLD-MCNC: 14.9 G/DL
INR BLD: 1.99 (ref 0.8–1.2)
KETONES UR STRIP.AUTO-MCNC: NEGATIVE MG/DL
LACTATE BLD-SCNC: 2.2 MMOL/L (ref 0.5–2)
LACTATE BLD-SCNC: 5.7 MMOL/L (ref 0.5–2)
LACTATE SERPL-SCNC: 2.8 MMOL/L (ref 0.5–2)
LACTATE SERPL-SCNC: 3.6 MMOL/L (ref 0.5–2)
LACTATE SERPL-SCNC: 4 MMOL/L (ref 0.5–2)
LACTATE SERPL-SCNC: 4 MMOL/L (ref 0.5–2)
LACTATE SERPL-SCNC: 5.6 MMOL/L (ref 0.5–2)
LACTATE SERPL-SCNC: 5.8 MMOL/L (ref 0.5–2)
LEUKOCYTE ESTERASE UR QL STRIP.AUTO: NEGATIVE
LYMPHOCYTES NFR BLD: 0.41 X10(3) UL (ref 1–4)
LYMPHOCYTES NFR BLD: 0.57 X10(3) UL (ref 1–4)
LYMPHOCYTES NFR BLD: 4 %
LYMPHOCYTES NFR BLD: 6 %
MAGNESIUM SERPL-MCNC: 2 MG/DL (ref 1.6–2.6)
MAGNESIUM SERPL-MCNC: 2.8 MG/DL (ref 1.6–2.6)
MCH RBC QN AUTO: 24.1 PG (ref 26–34)
MCH RBC QN AUTO: 24.1 PG (ref 26–34)
MCHC RBC AUTO-ENTMCNC: 32.1 G/DL (ref 31–37)
MCHC RBC AUTO-ENTMCNC: 33 G/DL (ref 31–37)
MCV RBC AUTO: 72.9 FL
MCV RBC AUTO: 75.2 FL
METAMYELOCYTES # BLD: 0.27 X10(3) UL
METAMYELOCYTES NFR BLD: 4 %
METHGB MFR BLD: 0 % SAT (ref 0.4–1.5)
METHGB MFR BLD: 0 % SAT (ref 0.4–1.5)
MONOCYTES # BLD: 0.2 X10(3) UL (ref 0.1–1)
MONOCYTES # BLD: 0.43 X10(3) UL (ref 0.1–1)
MONOCYTES NFR BLD: 3 %
MONOCYTES NFR BLD: 3 %
MRSA DNA SPEC QL NAA+PROBE: NEGATIVE
MYELOCYTES # BLD: 0.27 X10(3) UL
MYELOCYTES NFR BLD: 4 %
NEUTROPHILS # BLD AUTO: 13.47 X10 (3) UL (ref 1.5–7.7)
NEUTROPHILS # BLD AUTO: 6.48 X10 (3) UL (ref 1.5–7.7)
NEUTROPHILS NFR BLD: 81 %
NEUTROPHILS NFR BLD: 87 %
NEUTS BAND NFR BLD: 2 %
NEUTS BAND NFR BLD: 6 %
NEUTS HYPERSEG # BLD: 13.21 X10(3) UL (ref 1.5–7.7)
NEUTS HYPERSEG # BLD: 5.64 X10(3) UL (ref 1.5–7.7)
NITRITE UR QL STRIP.AUTO: NEGATIVE
NRBC BLD MANUAL-RTO: 3 %
NRBC BLD MANUAL-RTO: 5 %
OSMOLALITY SERPL CALC.SUM OF ELEC: 326 MOSM/KG (ref 275–295)
OXYHGB MFR BLDA: 96.8 % (ref 92–100)
OXYHGB MFR BLDA: 98.3 % (ref 92–100)
P AXIS: 84 DEGREES
P-R INTERVAL: 156 MS
PCO2 BLDA: 34 MM HG (ref 35–45)
PCO2 BLDA: 46 MM HG (ref 35–45)
PH BLDA: 7.28 [PH] (ref 7.35–7.45)
PH BLDA: 7.36 [PH] (ref 7.35–7.45)
PH UR STRIP.AUTO: 5 [PH] (ref 5–8)
PHOSPHATE SERPL-MCNC: 3.8 MG/DL (ref 2.4–5.1)
PLATELET # BLD AUTO: 113 10(3)UL (ref 150–450)
PLATELET # BLD AUTO: 98 10(3)UL (ref 150–450)
PLATELET MORPHOLOGY: NORMAL
PLATELET MORPHOLOGY: NORMAL
PLATELETS.RETICULATED NFR BLD AUTO: 4.9 % (ref 0–7)
PLATELETS.RETICULATED NFR BLD AUTO: 5.7 % (ref 0–7)
PO2 BLDA: 172 MM HG (ref 80–100)
PO2 BLDA: 89 MM HG (ref 80–100)
POTASSIUM BLD-SCNC: 3.9 MMOL/L (ref 3.6–5.1)
POTASSIUM BLD-SCNC: 4.8 MMOL/L (ref 3.6–5.1)
POTASSIUM SERPL-SCNC: 3.8 MMOL/L (ref 3.5–5.1)
POTASSIUM SERPL-SCNC: 4 MMOL/L (ref 3.5–5.1)
POTASSIUM SERPL-SCNC: 4 MMOL/L (ref 3.5–5.1)
PROT SERPL-MCNC: 4.8 G/DL (ref 5.7–8.2)
PROT SERPL-MCNC: 6.8 G/DL (ref 5.7–8.2)
PROT UR STRIP.AUTO-MCNC: NEGATIVE MG/DL
PROTEUS SP DNA BLD POS QL NAA+NON-PROBE: DETECTED
PROTHROMBIN TIME: 22.8 SECONDS (ref 11.6–14.8)
Q-T INTERVAL: 520 MS
QRS DURATION: 92 MS
QTC CALCULATION (BEZET): 474 MS
R AXIS: 73 DEGREES
RBC # BLD AUTO: 4.57 X10(6)UL
RBC # BLD AUTO: 6.17 X10(6)UL
RBC UR QL AUTO: NEGATIVE
SODIUM BLD-SCNC: 139 MMOL/L (ref 135–145)
SODIUM BLD-SCNC: 149 MMOL/L (ref 135–145)
SODIUM SERPL-SCNC: 148 MMOL/L (ref 136–145)
SODIUM SERPL-SCNC: 149 MMOL/L (ref 136–145)
SODIUM SERPL-SCNC: 150 MMOL/L (ref 136–145)
SP GR UR STRIP.AUTO: 1.02 (ref 1–1.03)
T AXIS: 74 DEGREES
T3 SERPL-MCNC: <0.1 NG/ML (ref 0.6–1.81)
T3FREE SERPL-MCNC: <0.5 PG/ML (ref 2.4–4.2)
T4 FREE SERPL-MCNC: 0.1 NG/DL (ref 0.8–1.7)
TOTAL CELLS COUNTED BLD: 100
TOTAL CELLS COUNTED BLD: 100
TSI SER-ACNC: 37.94 UIU/ML (ref 0.55–4.78)
UROBILINOGEN UR STRIP.AUTO-MCNC: 2 MG/DL
VENTRICULAR RATE: 50 BPM
WBC # BLD AUTO: 14.2 X10(3) UL (ref 4–11)
WBC # BLD AUTO: 6.8 X10(3) UL (ref 4–11)

## 2024-01-01 PROCEDURE — 93306 TTE W/DOPPLER COMPLETE: CPT | Performed by: NURSE PRACTITIONER

## 2024-01-01 PROCEDURE — 05HM33Z INSERTION OF INFUSION DEVICE INTO RIGHT INTERNAL JUGULAR VEIN, PERCUTANEOUS APPROACH: ICD-10-PCS | Performed by: INTERNAL MEDICINE

## 2024-01-01 PROCEDURE — 36620 INSERTION CATHETER ARTERY: CPT | Performed by: NURSE PRACTITIONER

## 2024-01-01 PROCEDURE — 74176 CT ABD & PELVIS W/O CONTRAST: CPT | Performed by: INTERNAL MEDICINE

## 2024-01-01 PROCEDURE — 71045 X-RAY EXAM CHEST 1 VIEW: CPT | Performed by: EMERGENCY MEDICINE

## 2024-01-01 PROCEDURE — 99222 1ST HOSP IP/OBS MODERATE 55: CPT | Performed by: NURSE PRACTITIONER

## 2024-01-01 PROCEDURE — 71045 X-RAY EXAM CHEST 1 VIEW: CPT | Performed by: INTERNAL MEDICINE

## 2024-01-01 PROCEDURE — 99223 1ST HOSP IP/OBS HIGH 75: CPT | Performed by: STUDENT IN AN ORGANIZED HEALTH CARE EDUCATION/TRAINING PROGRAM

## 2024-01-01 PROCEDURE — 3E033XZ INTRODUCTION OF VASOPRESSOR INTO PERIPHERAL VEIN, PERCUTANEOUS APPROACH: ICD-10-PCS | Performed by: HOSPITALIST

## 2024-01-01 PROCEDURE — 36556 INSERT NON-TUNNEL CV CATH: CPT | Performed by: INTERNAL MEDICINE

## 2024-01-01 PROCEDURE — 99291 CRITICAL CARE FIRST HOUR: CPT | Performed by: EMERGENCY MEDICINE

## 2024-01-01 PROCEDURE — B543ZZA ULTRASONOGRAPHY OF RIGHT JUGULAR VEINS, GUIDANCE: ICD-10-PCS | Performed by: INTERNAL MEDICINE

## 2024-01-01 PROCEDURE — 70450 CT HEAD/BRAIN W/O DYE: CPT | Performed by: EMERGENCY MEDICINE

## 2024-01-01 PROCEDURE — 03HY32Z INSERTION OF MONITORING DEVICE INTO UPPER ARTERY, PERCUTANEOUS APPROACH: ICD-10-PCS | Performed by: HOSPITALIST

## 2024-01-01 PROCEDURE — 99291 CRITICAL CARE FIRST HOUR: CPT | Performed by: INTERNAL MEDICINE

## 2024-01-01 PROCEDURE — 99239 HOSP IP/OBS DSCHRG MGMT >30: CPT | Performed by: HOSPITALIST

## 2024-01-01 RX ORDER — DEXTROSE MONOHYDRATE 25 G/50ML
50 INJECTION, SOLUTION INTRAVENOUS
Status: DISCONTINUED | OUTPATIENT
Start: 2024-01-01 | End: 2024-01-01

## 2024-01-01 RX ORDER — LORAZEPAM 2 MG/ML
2 INJECTION INTRAMUSCULAR EVERY 4 HOURS PRN
Status: DISCONTINUED | OUTPATIENT
Start: 2024-01-01 | End: 2024-11-27

## 2024-01-01 RX ORDER — ONDANSETRON 4 MG/1
4 TABLET, ORALLY DISINTEGRATING ORAL EVERY 6 HOURS PRN
Status: DISCONTINUED | OUTPATIENT
Start: 2024-01-01 | End: 2024-11-27

## 2024-01-01 RX ORDER — SODIUM BICARBONATE 325 MG/1
325 TABLET ORAL AS NEEDED
Status: DISCONTINUED | OUTPATIENT
Start: 2024-01-01 | End: 2024-01-01

## 2024-01-01 RX ORDER — LEVOTHYROXINE SODIUM 20 UG/ML
100 INJECTION, SOLUTION INTRAVENOUS DAILY
Status: DISCONTINUED | OUTPATIENT
Start: 2024-01-01 | End: 2024-01-01

## 2024-01-01 RX ORDER — MORPHINE SULFATE 2 MG/ML
0.5 INJECTION, SOLUTION INTRAMUSCULAR; INTRAVENOUS ONCE
Status: COMPLETED | OUTPATIENT
Start: 2024-01-01 | End: 2024-01-01

## 2024-01-01 RX ORDER — FAMOTIDINE 20 MG/1
20 TABLET, FILM COATED ORAL DAILY
Status: DISCONTINUED | OUTPATIENT
Start: 2024-01-01 | End: 2024-01-01

## 2024-01-01 RX ORDER — ONDANSETRON 2 MG/ML
4 INJECTION INTRAMUSCULAR; INTRAVENOUS EVERY 6 HOURS PRN
Status: DISCONTINUED | OUTPATIENT
Start: 2024-01-01 | End: 2024-01-01

## 2024-01-01 RX ORDER — HYDROCORTISONE SODIUM SUCCINATE 100 MG/2ML
100 INJECTION INTRAMUSCULAR; INTRAVENOUS EVERY 8 HOURS
Status: DISCONTINUED | OUTPATIENT
Start: 2024-01-01 | End: 2024-01-01

## 2024-01-01 RX ORDER — METOCLOPRAMIDE HYDROCHLORIDE 5 MG/ML
10 INJECTION INTRAMUSCULAR; INTRAVENOUS EVERY 8 HOURS PRN
Status: DISCONTINUED | OUTPATIENT
Start: 2024-01-01 | End: 2024-01-01

## 2024-01-01 RX ORDER — HEPARIN SODIUM 5000 [USP'U]/ML
5000 INJECTION, SOLUTION INTRAVENOUS; SUBCUTANEOUS EVERY 8 HOURS SCHEDULED
Status: DISCONTINUED | OUTPATIENT
Start: 2024-01-01 | End: 2024-01-01

## 2024-01-01 RX ORDER — DOXEPIN HYDROCHLORIDE 50 MG/1
1 CAPSULE ORAL DAILY
Status: DISCONTINUED | OUTPATIENT
Start: 2024-11-27 | End: 2024-01-01

## 2024-01-01 RX ORDER — ACETAMINOPHEN 500 MG
500 TABLET ORAL EVERY 6 HOURS PRN
Status: DISCONTINUED | OUTPATIENT
Start: 2024-01-01 | End: 2024-01-01

## 2024-01-01 RX ORDER — LORAZEPAM 2 MG/ML
1 INJECTION INTRAMUSCULAR EVERY 4 HOURS PRN
Status: DISCONTINUED | OUTPATIENT
Start: 2024-01-01 | End: 2024-11-27

## 2024-01-01 RX ORDER — NICOTINE POLACRILEX 4 MG
30 LOZENGE BUCCAL
Status: DISCONTINUED | OUTPATIENT
Start: 2024-01-01 | End: 2024-01-01

## 2024-01-01 RX ORDER — ACETAMINOPHEN 500 MG
500 TABLET ORAL EVERY 4 HOURS PRN
Status: DISCONTINUED | OUTPATIENT
Start: 2024-01-01 | End: 2024-01-01

## 2024-01-01 RX ORDER — METOCLOPRAMIDE HYDROCHLORIDE 5 MG/ML
5 INJECTION INTRAMUSCULAR; INTRAVENOUS EVERY 8 HOURS PRN
Status: DISCONTINUED | OUTPATIENT
Start: 2024-01-01 | End: 2024-01-01

## 2024-01-01 RX ORDER — LORAZEPAM 2 MG/ML
0.5 INJECTION INTRAMUSCULAR EVERY 4 HOURS PRN
Status: DISCONTINUED | OUTPATIENT
Start: 2024-01-01 | End: 2024-11-27

## 2024-01-01 RX ORDER — MELATONIN
100 DAILY
Status: DISCONTINUED | OUTPATIENT
Start: 2024-01-01 | End: 2024-01-01

## 2024-01-01 RX ORDER — LEVOTHYROXINE SODIUM 20 UG/ML
125 INJECTION, SOLUTION INTRAVENOUS DAILY
Status: DISCONTINUED | OUTPATIENT
Start: 2024-01-01 | End: 2024-01-01 | Stop reason: SDUPTHER

## 2024-01-01 RX ORDER — NICOTINE POLACRILEX 4 MG
15 LOZENGE BUCCAL
Status: DISCONTINUED | OUTPATIENT
Start: 2024-01-01 | End: 2024-01-01

## 2024-01-01 RX ORDER — DEXTROSE MONOHYDRATE 50 MG/ML
INJECTION, SOLUTION INTRAVENOUS CONTINUOUS
Status: DISCONTINUED | OUTPATIENT
Start: 2024-01-01 | End: 2024-01-01

## 2024-01-01 RX ORDER — ONDANSETRON 2 MG/ML
4 INJECTION INTRAMUSCULAR; INTRAVENOUS EVERY 6 HOURS PRN
Status: DISCONTINUED | OUTPATIENT
Start: 2024-01-01 | End: 2024-11-27

## 2024-01-01 RX ORDER — MORPHINE SULFATE 2 MG/ML
0.5 INJECTION, SOLUTION INTRAMUSCULAR; INTRAVENOUS EVERY 30 MIN PRN
Status: DISCONTINUED | OUTPATIENT
Start: 2024-01-01 | End: 2024-11-27

## 2024-01-01 RX ORDER — SCOLOPAMINE TRANSDERMAL SYSTEM 1 MG/1
1 PATCH, EXTENDED RELEASE TRANSDERMAL
Status: DISCONTINUED | OUTPATIENT
Start: 2024-01-01 | End: 2024-11-27

## 2024-01-02 ENCOUNTER — CASE MANAGEMENT (OUTPATIENT)
Dept: CARE COORDINATION | Age: 89
End: 2024-01-02

## 2024-05-17 NOTE — ED QUICK NOTES
Orders for admission, patient is aware of plan and ready to go upstairs. Any questions, please call ED RN Maggy Barragan at extension [phone]. Patient Covid vaccination status: Fully vaccinated     COVID Test Ordered in ED: SARS-CoV-2/Flu A and B/RSV by PCR (GeneXpert)    COVID Suspicion at Admission: N/A    Running Infusions:    sodium chloride          Mental Status/LOC at time of transport: A/O X3    Other pertinent information: Son is home sick, but can be reached by phone if needed.    CIWA score: N/A   NIH score:  N/A Sukhwinder Orellana

## 2024-11-25 PROBLEM — R65.21 SEPTIC SHOCK (HCC): Status: ACTIVE | Noted: 2024-01-01

## 2024-11-25 PROBLEM — N17.9 AKI (ACUTE KIDNEY INJURY) (HCC): Status: ACTIVE | Noted: 2024-01-01

## 2024-11-25 PROBLEM — L03.119 CELLULITIS OF UPPER EXTREMITY, UNSPECIFIED LATERALITY: Status: ACTIVE | Noted: 2024-01-01

## 2024-11-25 PROBLEM — A41.9 SEPTIC SHOCK (HCC): Status: ACTIVE | Noted: 2024-01-01

## 2024-11-25 PROBLEM — R41.82 ALTERED MENTAL STATUS, UNSPECIFIED ALTERED MENTAL STATUS TYPE: Status: ACTIVE | Noted: 2024-01-01

## 2024-11-25 PROBLEM — R00.1 BRADYCARDIA: Status: ACTIVE | Noted: 2024-01-01

## 2024-11-25 PROBLEM — T68.XXXA HYPOTHERMIA, INITIAL ENCOUNTER: Status: ACTIVE | Noted: 2024-01-01

## 2024-11-25 PROBLEM — E03.9 HYPOTHYROIDISM: Status: ACTIVE | Noted: 2024-01-01
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
## 2024-11-25 NOTE — ED QUICK NOTES
Pt does moan and attempt to pull away when performing procedures such as blood draw and blood pressure.

## 2024-11-25 NOTE — ED QUICK NOTES
Pt cleaned of feces and dried stool and urine as much as possible. Clean sheets placed with gown and hot packs to each axilla and back of neck. Towel wrapped around her head in order to maintain heat. Warm blankets placed as well as mistral air blanket.     Dr. Giron at bedside in order to observe wounds to body.     Upon cleaning pt pt noted to have extensive redness to her buttock area and down her legs, which is concern related to dried stool located to those areas of skin.

## 2024-11-25 NOTE — ED QUICK NOTES
Spoke with son via phone, he informed me that patient was up walking 4 days ago, going to bedside commode 4 days ago, was A&O x4 yesterday. Was unable to get her out of bed today and called ambulance.

## 2024-11-25 NOTE — ED QUICK NOTES
Several attempts made to place IV without success. Able to cannulate vein but instantly infiltrates. Pt lying on cart with eyes closed but will open to tactile and verbal stimuli. Pt appears contracted, lying in a fetal position on left side. Pt noted to have right arm bent significantly and difficult to straighten. Pt noted to have more mobility to left arm.    Attempting to find staff to place US IV and notify Dr. Giron of difficulty placing IV.

## 2024-11-25 NOTE — H&P
[facility]     Estephania Mota Patient Status:  Emergency    1928 MRN [id number]   Location [facility] EMERGENCY DEPARTMENT Attending Brennan Giron,    Hosp Day # 0 PCP Cinthia Leos MD     Chief Complaint: minimally responsive    Subjective:    History of Present Illness:     Estephania Mota is a 89 year old female with PMhx of thyroid disease presents with decreased mentation and failure to thrive. Pt was found by EMS with mulitple bed sores and covered in feces. Pt is nonverbal and has bugs flying around her. On arrival to ED pt was ntoed to have hypothermic, hypotensive, bradycardic. Pt is minimally responsive. She has history of thyroid disease and is unclear if she has been on any of her medications. Pt was given IVF and guanaco hugger and empiric abx and pressor support. Pt is DNR/S. No family at bedside.     History/Other:    Past Medical History:  Past Medical History:    Thyroid disease     Past Surgical History:   Past Surgical History:   Procedure Laterality Date    Hip surgery        Family History:   No family history on file.  Social History:    reports that she has never smoked. She does not have any smokeless tobacco history on file. She reports that she does not currently use alcohol. She reports that she does not currently use drugs.     Allergies: Allergies[1]    Medications:  Medications Ordered Prior to Encounter[2]    Review of Systems:   A comprehensive review of systems was completed.    Pertinent positives and negatives noted in the HPI.    Objective:   Physical Exam:    /37 (BP Location: Left arm)   Pulse 57   Temp (!) 88.3 °F (31.3 °C) (Rectal)   Resp 26   Wt 90 lb (40.8 kg)   SpO2 96%   BMI 15.94 kg/m²   General: dishevled, fould smelling, flying bugys  Respiratory: No rhonchi, no wheezes  Cardiovascular: bradycardia  Abdomen: Soft, Non-tender, non-distended, positive bowel sounds  Neuro: No new focal deficits  Extremities: No edema,  multiple wounds      Results:    Labs:      Labs Last 24 Hours:    Recent Labs   Lab 11/25/24  1515   RBC 6.17*   HGB 14.9   HCT 46.4   MCV 75.2*   MCH 24.1*   MCHC 32.1   RDW 19.0   NEPRELIM 6.48   WBC 6.8   .0*       Recent Labs   Lab 11/25/24  1515 11/25/24  1630   GLU 88  --    BUN 60*  --    CREATSERUM 1.70*  --    EGFRCR 27*  --    CA 9.6  --    ALB 3.9  --    *  --    K  --  3.8   *  --    CO2 24.0  --    ALKPHO 182*  --    AST  --  200*   *  --    BILT 0.4  --    TP 6.8  --        Lab Results   Component Value Date    INR 1.15 10/15/2015       Recent Labs   Lab 11/25/24  1630   *       No results for input(s): \"TROP\", \"PBNP\" in the last 168 hours.    No results for input(s): \"PCT\" in the last 168 hours.    Imaging: Imaging data reviewed in Epic.    Assessment & Plan:      #Septic Shock  Continue empiric abx  Pressor support  Aggressive hydration  Follow cultures  Monitor in ICU  Follow LA    #Acute Kidney Injury  IVF  Monitor renal function    #Hypothermia  Likely related to sepsis and thyroid disorder  IV synthroid  Endocirne eval in AM  Continue guanaco hugger  IV solucortef    #Severe Hypothyroid/Possibel Myxedema  IV synthroid  Endocrine eval in AM    #Bradycardia  Likely related to hypothermia and thyroid  Monitor response to treatment above    #Acute Toxic Encephalopathy  CT head without active bleed or CVA    #L Cerebellowpontine angle mass  Consider MRI IAC protocol w/wo as outpatient    #Failure to thrive  Social work eval  Consider report to adult protective services     A total of 35 minutes of critical care time (exclusive of billable procedures) was administered. This involved direct patient intervention, complex decision making, and/or extensive discussions (>50% face to face time) with the patient, family, and clinical staff.                   Plan of care discussed with patient, ED Physician     Mario Carreno MD    Supplementary Documentation:     The 21st  Century Cures Act makes medical notes like these available to patients in the interest of transparency. Please be advised this is a medical document. Medical documents are intended to carry relevant information, facts as evident, and the clinical opinion of the practitioner. The medical note is intended as peer to peer communication and may appear blunt or direct. It is written in medical language and may contain abbreviations or verbiage that are unfamiliar.                                       [1] No Known Allergies  [2]   No current facility-administered medications on file prior to encounter.     Current Outpatient Medications on File Prior to Encounter   Medication Sig Dispense Refill    Levothyroxine Sodium (SYNTHROID, LEVOTHROID) 125 MCG Oral Tab Take 1 tablet (125 mcg total) by mouth before breakfast.  0    Calcium Carbonate-Vitamin D 600-400 MG-UNIT Oral Tab Take 1 tablet by mouth daily.

## 2024-11-25 NOTE — ED QUICK NOTES
Pt continues to lie on left side, eyes open but not following commands. Warm packs remain in place to both axilla. Warming blanket in place and pt receiving sepsis bolus with warmer.    Now will attempt to obtain 2nd blood culture as well as redraw K and SGOT.

## 2024-11-25 NOTE — ED QUICK NOTES
Upon arrival, pt with her eyes open, moaning intermittently but non verbal. Skin cold and pale. Pt noted to have a red top on that feels wet. Pt with no bottoms in place. Pt noted to have dried stool to legs, several wounds noted to right thigh, as well as flying insects coming off of her body.    Due to concern for hypothermia, plan to immediately clean pt and remove all clothing begin actively warming her.

## 2024-11-25 NOTE — ED QUICK NOTES
Pt's son called looking for update x 2 and informed that staff will have to call him back intensivist and Dr. Giron aware he called and can be contacted.
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
## 2024-11-25 NOTE — CONSULTS
ICU  Critical Care APRN Progress Note    NAME: Estephania Mota - ROOM: B1/B1 - MRN: [id number] - Age: 89 year old - :1928    History Of Present Illness:  Estephania Mota is a 89 year old female with PMHx significant for hypothyroidism was brought to ER vis EMS for altered mental status. EMS reports finding patient laying on couch covered in feces with bugs flying around. ER work-up indicates patient being responsive to verbal, hypotensive, hypothermic, and bradycardic with multiple wounds to the right hip/leg/left leg. Patient given fluid bolus through fluid warmer and guanaco hugger placed. Zosyn started in ER. Patient to be admitted to ICU for close hemodynamic monitoring and possible vasopressors.     PMH:  Past Medical History:    Thyroid disease       Social Hx:  Social History     Socioeconomic History    Marital status:    Tobacco Use    Smoking status: Never   Substance and Sexual Activity    Alcohol use: Not Currently    Drug use: Not Currently     Social Drivers of Health     Food Insecurity: No Food Insecurity (11/15/2023)    Food Insecurity     Food Insecurity: Never true   Transportation Needs: Unmet Transportation Needs (11/15/2023)    Transportation Needs     Lack of Transportation: Yes   Housing Stability: Low Risk  (11/15/2023)    Housing Stability     Housing Instability: No       Family Hx:  No family history on file.      Review of Systems:   A comprehensive 10 point review of systems was completed.  Pertinent positives and negatives noted in the HPI.    OBJECTIVE  Vitals:  BP (!) 81/39   Pulse (!) 47   Temp (!) 86.6 °F (30.3 °C) (Rectal)   Resp 21   Wt 90 lb (40.8 kg)   SpO2 97%   BMI 15.94 kg/m²       Physical Exam:    General Appearance: Not responding to commands, mild distress, appears stated age  Neck: No JVD, neck supple, no adenopathy, trachea midline.  Lungs: Clear to auscultation bilaterally, respirations unlabored  Heart: Regular rate and rhythm, S1 and S2 normal, no  murmur, rub or gallop  Abdomen: Soft, non-tender, bowel sounds active all four quadrants, no masses, no organomegaly  Extremities: Extremities with multiple wounds, 2+ edema,capillary refill <3 sec.    Pulses: 2+ and symmetric all extremities  Skin: Skin color, texture, turgor normal for ethnicity, cool and dry  Neurologic: Eyes open to verbal    Data this admission:  No results found.    Labs:  Lab Results   Component Value Date    WBC 6.8 11/25/2024    HGB 14.9 11/25/2024    HCT 46.4 11/25/2024    .0 11/25/2024    CREATSERUM 1.70 11/25/2024    BUN 60 11/25/2024     11/25/2024    K  11/25/2024      Comment:      Specimen is slightly hemolyzed. Result omitted due to unacceptable amount of inaccuracy in the test result caused by slight hemolysis.     11/25/2024    CO2 24.0 11/25/2024    GLU 88 11/25/2024    CA 9.6 11/25/2024    ALB 3.9 11/25/2024    ALKPHO 182 11/25/2024    BILT 0.4 11/25/2024    TP 6.8 11/25/2024    AST  11/25/2024      Comment:      Specimen is slightly hemolyzed. Result omitted due to unacceptable amount of inaccuracy in the test result caused by slight hemolysis.     11/25/2024    MG 2.8 11/25/2024       Assessment/Plan:    Shock likely septic in setting of hypothermia and bradycardia, possible hypovolemia secondary to poor oral intake  -Vasopressors to keep SBP >90 and MAP >65  -Bradycardia secondary to hypothermia   -Maury hugger and fluid warmer  -Blood cultures and urine cultures pending  -may need to culture wounds  -Wound care consult  -Will check CK  -Lactic acid 3.6- will trend    Hypernatremia, AGUSTÍN  -Consider renal consult  -D5W  -Monitor labs  -avoid nephrotoxins    Hypothyroid  -IV synthroid  -Possible need for steroid once normothermic if remains hypotensive  -Consider Endo consult    AMS  -Could be related to all of the above  -CT brain pending    Failure to thrive  -Multiple bed sores and cachectic   -Will get social work  involved    F/E/N  -NPO  -IVF  -Replete electrolytes as needed    Proph  -SCDs  -Heparin    Dispo  -DNR Select, ongoing conversation with family  -ICU for risk of deterioration      Plan of care discussed with intensivist on-call, Dr. Arteaga.    SARITA Silva.    Patient examine with student, agree with assessment and plan.  Dr. Arteaga discussed with son Fermin patients current condition.  DNAR select discussed and no objections from son.  Will update son once CT and labs result.    Prerna Sánchez, [facility]  Critical Care NP  Phone [phone]      A total of 60 minutes of critical care time (exclusive of billable procedures) was administered. This involved direct patient intervention, complex decision making, and/or extensive discussions with the patient, family, and clinical staff.      Agree with assessment and plan as above    In brief this is a 89-year-old unfortunate female history of hypertension hypothyroidism who was found in her own feces and feculence at least 4 the last few days based on physical exam.   Current issues  1.  Neurologic encephalopathy likely secondary to hypoactive delirium from possible severe hypothyroidism with dehydration and sepsis from urinary tract infection or other infectious cause  2.  Pulmonary no signs of respiratory failure we will follow-up on chest x-ray  3.  Cardiovascular hypothermia hypotension bradycardia all likely secondary to sepsis but certainly given elevated TSH and undetectable free T4 certainly can be a component of myxedema less likely will do bedside echo to rule out pericardial effusion  4.  GI no hermelindo abdominal pathology based on exam will likely need Dobbhoff feeding tube if does not wake up  5.  Renal mild acute kidney injury likely secondary to   6 infectious disease no fever no leukocytosis we will probably cover with antibiotics urinalysis and chest x-ray clean at this point although patient on mild so may not have \"fluffed out infiltrate\".  7.   Hematology DVT prophylaxis no significant anemia thrombus #8 endocrine history of hypothyroidism TSH very elevated with free T4 of 0.1 will benefit from IV Synthroid also IV hydrocortisone cannot clinically rule out myxedema coma given presentation and lack of history little downside to treating    45 minutes critical care time spent with this patient with respect to management shock sepsis possible myxedema date of service 11/25/2020

## 2024-11-26 PROBLEM — R57.9 SHOCK (HCC): Status: ACTIVE | Noted: 2024-01-01

## 2024-11-26 PROBLEM — Z51.5 PALLIATIVE CARE ENCOUNTER: Status: ACTIVE | Noted: 2024-01-01

## 2024-11-26 PROBLEM — Z71.89 COUNSELING REGARDING ADVANCE CARE PLANNING AND GOALS OF CARE: Status: ACTIVE | Noted: 2024-01-01

## 2024-11-26 NOTE — CDS QUERY
Dear Dr. Carreno:  Please clarify diagnosis associated with lactic acid of 5.8:     ( x) Lactic Acidosis caused by dead ischemic bowel   ( ) Other _________________________________        Documentation from the Medical Record:                                              11/26 11/25  LACTIC ACID (mmol/L)  5.8HH 2.8H      __ h&p: septic shock - pressor support; malgorzata - ivf; hypothermia - likely r/t sepsis and thyroid disoder; severe hypothyroid/possible myxedema; bradycardia likely r/t hypothermia and thyroid; acute toxic encephalopathy ; L cerebellopontine angle mass; ftt;     __11/26 critical care consult: Cachetic appearing; 2+pitting edema from mid thigh to toes shock - likely septic; possible wound infections; mild rhabdomyolysis; hypernatremia; malgorzata - likely prerenal d/t dehydration; poor po intake - crea on admit 1.7; strict i/o; avoid nephrotoxic agents; acute encephalopathy  likely d/t hypoactive delirium from severe hypothyroidism with dehydration and sepsis; thrombocytopenia, coagulopathy likely d/t sepsis; acute on chronic microcytic anemia - drop - likely dilutional ; tranaminitis; ftt; hypoalbuminemia; hypernatremia - d5 ivf    Treatment:  iv fluids; iv antibiotics     Use of terms such as suspected, possible, or probable (associated with a specific diagnosis that is being evaluated, monitored, or treated as if it exists) are acceptable and can be coded in the inpatient setting, when documented at the time of discharge.        Please add any additional documentation to your progress note and continue to document this through discharge.          For questions regarding this query, please contact Clinical Documentation : Ethel Mohan RN [facility]  Cell#  [phone]    Thank you!                                                           THIS FORM IS A PERMANENT PART OF THE MEDICAL RECORD

## 2024-11-26 NOTE — PROGRESS NOTES
Pharmacy Note:  Stress Ulcer Prophylaxis Initialization        Estephania Mota is a 89 year old female who meets criteria for the initiation of stress ulcer prophylaxis based on the presence of 1 major risk factor for stress ulcer development:  coagulopathy (INR > 1.5  - not on AC).    Ordered famotidine 20mg q24hr per pharmacy protocol.        Thank you,   Stewart Lott, PharmD, BCPS  11/26/2024  12:29 PM

## 2024-11-26 NOTE — CDS QUERY
Dear Dr. Carreno:  Please clarify diagnosis of Acute Kidney Injury:    (x ) AGUSTÍN as evidenced by: _______sepsis, hypotension, pressor  ischemic bowel ____________________________  ( ) AGUSTÍN ruled out  ( ) Other _________________________________        Documentation from the Medical Record:                                    11/26 11/25 11/25  CREATININE (mg/dL) 1.71H 1.66H 1.70H       __h&p: septic shock - pressor support; agustín - ivf; hypothermia - likely r/t sepsis and thyroid disorder; severe hypothyroid/possible myxedema; bradycardia likely r/t hypothermia and thyroid; acute toxic encephalopathy ; L cerebellopontine angle mass; ftt;    __11/26 critical care consult: Cachetic appearing; 2+pitting edema from mid thigh to toes shock - likely septic; possible wound infections; mild rhabdomyolysis; hypernatremia; agustín - likely prerenal d/t dehydration; poor po intake - crea on admit 1.7; strict i/o; avoid nephrotoxic agents; acute encephaltophy likely d/t hypoactive delirium from severe hypothryodiism with dehydration and sepsis; thrombocytopenia, coaguloapthy likely d/t sepsis; acute on chronic microcytic anemia - drop - likely dilutional ; tranaminitis; ftt; hypoalbuminemia; hypernatremia - d5 ivf      Treatment: iv fluids;       *The current consensus-based authoritative criteria for acute kidney injury (AGUSTÍN) comes from the National Kidney Foundation KDIGO conference definition.  KDIGO defines AGUSTÍN (applicable to both adult or pediatric patients) as any of the following:                   Increase in creatinine level to >= 1.5x baseline (historical or measured), which is known or presumed to have occurred within the prior 7 days; or                Increase (not a decrease) in creatinine of >= 0.3 mg/dL comparing two separate levels, the second done within 48 hours or less of the first; or                Urine output < 0.5 ml/kg/hr for 6 hours     The KDIGO criteria apply to patients with and without CKD.      When baseline creatinine is unknown, KDIGO advises: “The lowest SCr [Creatinine level] obtained during a hospitalization is usually equal to or greater than the baseline. This SCr should be used to diagnose (and stage) AGUSTÍN.” Documentation from the Medical Record:     *Reference KDIGO     Use of terms such as suspected, possible, or probable (associated with a specific diagnosis that is being evaluated, monitored, or treated as if it exists) are acceptable and can be coded in the inpatient setting, when documented at the time of discharge.        Please add any additional documentation to your progress note and continue to document this through discharge.          For questions regarding this query, please contact Clinical Documentation : Ethel Mohan RN CDS  Cell#  [phone]    Thank you!                                                           THIS FORM IS A PERMANENT PART OF THE MEDICAL RECORD

## 2024-11-26 NOTE — PROGRESS NOTES
Patient returned from CT scan. Noted to have more retractions with breaths and very diminished breath sounds bilaterally. Concern for airway protection in setting of altered mental status, however, DNAR select. Updated son/POA- Fermin via phone on changes in breathing pattern. Discussed option of comfort care measures. He would like to continue to current treatment, however, is ok with giving a dose of IV morphine if needed if patient appears uncomfortable or appears to have difficulty in breathing/air hunger. He understands consequences of possible worsening hypotension or respiratory compromise with administration of pain medication and is ok with this if his mother appears to be uncomfortable or struggling to breath. Ongoing [facility] discussions.       Addendum 1600:  CT A/P results showed small amount of extraluminal air along expected course of the SMV and also possible extraluminal air anterior to the sigmoid colon.  Constellation of findings is very suspicious   for necrotic bowel although the exact location and etiology are indeterminate.    Updated Fermin-POA.son on results and explained this patient will likely decline further and not survive this hospitalization. Recommended comfort care measures and Fermin is in agreement. Explain comfort care process again to Fermin and his daugther/patient's Granddaughter, Yuko. All questions and concerns addressed. Yuko to come to hospital and will begin comfort care when she arrives. Fermin is unable to come due to health/sickness issues.     Terri Tripathi, MEÑO-BC  ICU  Phone  [phone]   Pager [phone]

## 2024-11-26 NOTE — CONSULTS
[facility]   part of [facility]    Report of Consultation    Estephania Mota Patient Status:  Inpatient    1928 MRN [id number]   Location [facility] 4SW-A Attending Cirilo Mcdonnell MD   Hosp Day # 1 PCP Cinthia Leos MD     Date of Admission:  2024  Date of Consult:  2024    Reason for Consultation:   Severe hypothyroidism    History of Present Illness:   Patient is a 89 year old female with PMHx significant for hypothyroidism, who was admitted to the hospital for altered mental status.  History obtained via chart review and from staff members.  Per review, patient was found lying on her couch covered in feces with bugs flying around her.  On presentation to the ER patient was found to be hypotensive, hypothermic, bradycardic.  Labs notable for TSH 37, free T40.1.  CMP with hyponatremia, creatinine 1.7, glucose without hypoglycemia at 88.  Lactic acid elevated at 3.6.  Patient was given IV Zosyn and IV fluid bolus per sepsis protocol.  She was started on hydrocortisone 100 mg every 8 hours and IV levothyroxine 100 mcg first dose given 2024 at 7:47 PM and next dose given at 2024 at 9 AM.        Past Medical History  Past Medical History:    Thyroid disease       Past Surgical History  Past Surgical History:   Procedure Laterality Date    Hip surgery         Family History  No family history on file.    Social History  Social History     Socioeconomic History    Marital status:    Tobacco Use    Smoking status: Never   Substance and Sexual Activity    Alcohol use: Not Currently    Drug use: Not Currently     Social Drivers of Health     Food Insecurity: No Food Insecurity (11/15/2023)    Food Insecurity     Food Insecurity: Never true   Transportation Needs: Unmet Transportation Needs (11/15/2023)    Transportation Needs     Lack of Transportation: Yes   Housing Stability: Low Risk  (11/15/2023)    Housing Stability     Housing Instability: No           Current  Medications:  Current Facility-Administered Medications   Medication Dose Route Frequency    glucose (Dex4) 15 GM/59ML oral liquid 15 g  15 g Oral Q15 Min PRN    Or    glucose (Glutose) 40% oral gel 15 g  15 g Oral Q15 Min PRN    Or    glucose-vitamin C (Dex-4) chewable tab 4 tablet  4 tablet Oral Q15 Min PRN    Or    dextrose 50% injection 50 mL  50 mL Intravenous Q15 Min PRN    Or    glucose (Dex4) 15 GM/59ML oral liquid 30 g  30 g Oral Q15 Min PRN    Or    glucose (Glutose) 40% oral gel 30 g  30 g Oral Q15 Min PRN    Or    glucose-vitamin C (Dex-4) chewable tab 8 tablet  8 tablet Oral Q15 Min PRN    vasopressin (Vasostrict) 20 Units in sodium chloride 0.9% 100 mL infusion for septic shock  0.01-0.03 Units/min Intravenous Continuous    acetaminophen (Tylenol Extra Strength) tab 500 mg  500 mg Oral Q6H PRN    piperacillin-tazobactam (Zosyn) 4.5 g in dextrose 5% 100 mL IVPB-ADDV  4.5 g Intravenous Q8H    amikacin (Amikin) 750 mg in sodium chloride 0.9% 250 mL IVPB  15 mg/kg Intravenous Once    thiamine (Vitamin B1) tab 100 mg  100 mg Per NG Tube Daily    famotidine (Pepcid) tab 20 mg  20 mg Oral Daily    metoclopramide (Reglan) 5 mg/mL injection 5 mg  5 mg Intravenous Q8H PRN    dextrose 10% infusion (TPN no rate)   Intravenous Continuous PRN    pancrelipase (Lip-Prot-Amyl) (Zenpep) DR particles cap 10,000 Units  10,000 Units Per G Tube PRN    And    sodium bicarbonate tab 325 mg  325 mg Per G Tube PRN    [START ON 11/27/2024] multivitamin (Tab-A-Soraida/Beta Carotene) tab 1 tablet  1 tablet Per NG Tube Daily    dextrose 5% infusion   Intravenous Continuous    norepinephrine (Levophed) 4 mg/250mL infusion premix  0.5-30 mcg/min Intravenous Continuous    hydrocortisone Na succinate PF (Solu-CORTEF) injection 100 mg  100 mg Intravenous Q8H    levothyroxine (Synthroid) 100 MCG/5ML injection 100 mcg  100 mcg Intravenous Daily    heparin (Porcine) 5000 UNIT/ML injection 5,000 Units  5,000 Units Subcutaneous Q8H CARLOS     ondansetron (Zofran) 4 MG/2ML injection 4 mg  4 mg Intravenous Q6H PRN     Medications Prior to Admission   Medication Sig    Levothyroxine Sodium (SYNTHROID, LEVOTHROID) 125 MCG Oral Tab Take 1 tablet (125 mcg total) by mouth before breakfast.    Calcium Carbonate-Vitamin D 600-400 MG-UNIT Oral Tab Take 1 tablet by mouth daily.       Allergies  Allergies[1]    Review of Systems:   A 10 point review of systems is completed with pertinent positives and negatives noted in HPI.    Physical Exam:   Vital Signs:  Blood pressure 130/46, pulse 66, temperature (!) 96.3 °F (35.7 °C), temperature source Rectal, resp. rate 21, height 5' 3\" (1.6 m), weight 102 lb 8.2 oz (46.5 kg), SpO2 93%.     General: resting in bed      Cardiovascular: bradycardic,    Neurologic: eyes closed, not following commands.   Integument:  Skin is dry     Results:     Laboratory Data:  Lab Results   Component Value Date    WBC 14.2 (H) 11/26/2024    HGB 11.0 (L) 11/26/2024    HCT 33.3 (L) 11/26/2024    PLT 98.0 (L) 11/26/2024    CREATSERUM 1.71 (H) 11/26/2024    BUN 68 (H) 11/26/2024     (H) 11/26/2024    K 4.0 11/26/2024     (H) 11/26/2024    CO2 21.0 11/26/2024     (H) 11/26/2024    CA 7.6 (L) 11/26/2024    ALB 2.9 (L) 11/26/2024    ALKPHO 151 (H) 11/26/2024    TP 4.8 (L) 11/26/2024     (H) 11/26/2024    ALT 98 (H) 11/26/2024    PTT 28.7 10/15/2015    INR 1.99 (H) 11/26/2024    PTP 22.8 (H) 11/26/2024    T4F 0.1 (L) 11/25/2024    TSH 37.939 (H) 11/25/2024    MG 2.0 11/26/2024    PHOS 3.8 11/26/2024     (H) 11/26/2024         Recent Labs     11/25/24  1515   T4F 0.1*   TSH 37.939*   No previous thyroid labs on file    Imaging:  Pertinent imaging reviewed      Impression:        # Severe hypothyroidism suggestive of myxedema coma  Patient presented with hypothermia, bradycardia at 85.6 °F, altered mental status and was found to have labs with TSH of 37, free T40.1, total and free T3 undetectable.  This is likely severe  hypothyroidism/suggestive of myxedema coma.  Low T3 can be multifactorial and secondary to severe hypothyroidism as well as euthyroid sick with concurrent sepsis  Her current myxedema score is greater than 60.  She receives 20 points for thermoregulatory dysfunction, 20 points for central nervous system dysfunction, 10 points for precipitating event with sepsis, 20 points or bradycardia, 20 points for hypotension,.  Discussed with intensivist and bedside echo without pericardial effusion.  Initial CMP without hyponatremia, hypercarbia, or hypoglycemia.  Her current presentation and scale is greater than 60 which is highly suggestive of myxedema coma.  Patient received hydrocortisone 100 mg every 8 hours starting 11/25 at 7:47 PM with IV levothyroxine 100 mcg (first dose 11/25 at 7:47 PM and second dose 11/26 at 9:06 AM).  Patient's weight is 46.5 kg and p.o. equivalent is 74.4 mcg (IV 55 mcg).  Patient is currently receiving p.o. equivalent of around 135 mcg.     Recommendations:  Continue IV hydrocortisone 100 mg every 8 hours.  Will follow vitals and plan on de-escalating to 50 mg every 8 hours tomorrow  Continue IV levothyroxine 100 mcg since both temperature and pulse are improving  Will plan on repeating free T4 tomorrow and if patient with persistent bradycardia and hypothermia will will consider increasing IV levothyroxine as well as adding T3.  Will await final echo prior to addition of T3.     Once patient is more stable can hold evening dose of hydrocortisone and test a.m. cortisol to rule out adrenal insufficiency    Thank you for allowing me to participate in the care of your patient.    Consultation time today was spent interviewing patient, reviewing previous records (labs, imaging, previous documentation), examining patient, educating patient and family on appropriate information, placing orders, completing documentation, discussing plan with staff/pharmacy and other providers.    Inpatient  endocrinology coverage is available M-F, 8AM-4PM. Management outside these hours and weekends per primary service.     Linda Albarado DO  11/26/2024    Addendum: Patient to follow comfort care measures. Endocrine service will sign off at this time.        [1] No Known Allergies

## 2024-11-26 NOTE — ED PROVIDER NOTES
Patient Seen in: [facility] 4sw-a      History     Chief Complaint   Patient presents with    Altered Mental Status     Stated Complaint: ams    Subjective:   HPI      89-year-old female presents emerged department for evaluation of altered mental status.  Per EMS they were called by family due to patient's mental status changes, when EMS arrived patient was found to be lying on couch soiled in her own feces, there reportedly were multiple bugs and flies around her and the house was in disarray.  Patient is not able to provide any history all history from EMS.    Objective:     Past Medical History:    Thyroid disease              Past Surgical History:   Procedure Laterality Date    Hip surgery                  Social History     Socioeconomic History    Marital status:    Tobacco Use    Smoking status: Never   Substance and Sexual Activity    Alcohol use: Not Currently    Drug use: Not Currently     Social Drivers of Health     Food Insecurity: No Food Insecurity (11/15/2023)    Food Insecurity     Food Insecurity: Never true   Transportation Needs: Unmet Transportation Needs (11/15/2023)    Transportation Needs     Lack of Transportation: Yes   Housing Stability: Low Risk  (11/15/2023)    Housing Stability     Housing Instability: No                  Physical Exam     ED Triage Vitals   BP 11/25/24 1430 107/66   Pulse 11/25/24 1430 (!) 48   Resp 11/25/24 1430 19   Temp 11/25/24 1420 (!) 85.6 °F (29.8 °C)   Temp src 11/25/24 1420 Rectal   SpO2 11/25/24 1430 95 %   O2 Device 11/25/24 1430 None (Room air)       Current Vitals:   Vital Signs  BP: (!) 133/115  Pulse: 66  Resp: 22  Temp: (!) 93 °F (33.9 °C)  Temp src: Rectal  MAP (mmHg): (!) 119    Oxygen Therapy  SpO2: 94 %  O2 Device: None (Room air)  Pulse Oximetry Type: Continuous  Oximetry Probe Site Changed: No  Pulse Ox Probe Location: Nasal bridge        Physical Exam  GENERAL: Patient is awake, follows some commands  HEENT: Moderately dry no  scleral icterus.  Mucous membranes are moderately dry.  Scalp is atraumatic.  NECK: Neck is supple, there is no nuchal rigidity.    HEART: Bradycardic rate regular rhythm   LUNGS: Clear to auscultation bilaterally.  No Rales, no rhonchi, no wheezing, no stridor.  ABDOMEN: Soft, nondistended,non tender  EXTREMITIES: No peripheral edema, no calf tenderness, dorsal pedal pulses present and equal bilaterally.  SKIN: Multiple pressure ulcers to the back abdomen and lower extremities      ED Course     Labs Reviewed   COMP METABOLIC PANEL (14) - Abnormal; Notable for the following components:       Result Value    Sodium 150 (*)     Chloride 114 (*)     BUN 60 (*)     Creatinine 1.70 (*)     Calculated Osmolality 326 (*)     eGFR-Cr 27 (*)      (*)     Alkaline Phosphatase 182 (*)     All other components within normal limits   CBC WITH DIFFERENTIAL WITH PLATELET - Abnormal; Notable for the following components:    RBC 6.17 (*)     .0 (*)     MCV 75.2 (*)     MCH 24.1 (*)     All other components within normal limits   URINALYSIS WITH CULTURE REFLEX - Abnormal; Notable for the following components:    Urobilinogen Urine 2 (*)     All other components within normal limits   MAGNESIUM - Abnormal; Notable for the following components:    Magnesium 2.8 (*)     All other components within normal limits   TSH W REFLEX TO FREE T4 - Abnormal; Notable for the following components:    TSH 37.939 (*)     All other components within normal limits   LACTIC ACID, PLASMA - Abnormal; Notable for the following components:    Lactic Acid 3.6 (*)     All other components within normal limits   LACTIC ACID REFLEX POST POSTIVE - Abnormal; Notable for the following components:    Lactic Acid 2.8 (*)     All other components within normal limits   T4, FREE (S) - Abnormal; Notable for the following components:    Free T4 0.1 (*)     All other components within normal limits   REDRAW AST (SGOT) (P) - Abnormal; Notable for the  following components:     (*)     All other components within normal limits   MANUAL DIFFERENTIAL - Abnormal; Notable for the following components:    Lymphocyte Absolute Manual 0.41 (*)     Metamyelocyte Absolute Manual 0.27 (*)     Myelocyte Absolute Manual 0.27 (*)     NRBC 3 (*)     RBC Morphology See morphology below (*)     Echinocytes, Cyrus Cells 2+ (*)     All other components within normal limits   ABG PANEL W ELECT AND LACTATE - Abnormal; Notable for the following components:    ABG pH 7.28 (*)     ABG pCO2 46 (*)     ABG pO2 172 (*)     ABG Base Excess -5.1 (*)     Total Hemoglobin 11.5 (*)     Methemoglobin 0.0 (*)     Sodium Blood Gas 149 (*)     Lactic Acid (Blood Gas) 2.2 (*)     All other components within normal limits   CK CREATINE KINASE (NOT CREATININE) - Abnormal; Notable for the following components:     (*)     All other components within normal limits   BASIC METABOLIC PANEL (8) - Abnormal; Notable for the following components:    Glucose 148 (*)     Sodium 149 (*)     Chloride 116 (*)     CO2 18.0 (*)     BUN 54 (*)     Creatinine 1.66 (*)     Calcium, Total 8.1 (*)     Calculated Osmolality 326 (*)     eGFR-Cr 28 (*)     All other components within normal limits   LACTIC ACID REFLEX POST POSITIVE [id number] - Abnormal; Notable for the following components:    Lactic Acid 4.0 (*)     All other components within normal limits   REDRAW POTASSIUM (P) - Normal   ED/MRSA SCREEN BY PCR-CC - Normal   SCAN SLIDE   RAINBOW DRAW BLUE   RAINBOW DRAW GOLD   BLOOD CULTURE   BLOOD CULTURE   C. DIFFICILE(TOXIGENIC)PCR     EKG    Rate, intervals and axes as noted on EKG Report.  Rate: 50  Rhythm: Sinus Rhythm  Reading: Sinus bradycardia, no ST elevation                A total of 80 minutes of critical care time (exclusive of billable procedures) was administered to manage the patient's critical lab values, unstable vital signs, and metabolic instability due to her sepsis, hypothermia, bradycardia,  severe hypothyroidism.  This involved direct patient intervention, complex decision making, and/or extensive discussions with the patient, family, and clinical staff.         MDM        Differential diagnosis before testing includes but not limited to pneumonia, urinary tract infection, electrolyte abnormality, thyroid dysfunction, sepsis, which is a medical condition that poses a threat to life/function    Radiographic images  I personally reviewed the radiographs and my individual interpretation shows CT brain no intracranial hemorrhage  I also reviewed the official reports that showed chest x-ray no consolidation, CT brain no hemorrhage or extra-axial fluid collection    History obtained by external source  (EMS, family, law enforcement, )other sources of medical information included history per EMS as in HPI    Discussion of management (consult/physicians, social work, pharmacy,ect) critical care Dr. Arteaga, [facility] Dr. Carreno    Medications Provided: IV normal saline, Zosyn, Levophed     Course of Events during Emergency Room Visit include patient placed on cardiac monitor and pulse ox, patient was noted to be bradycardic, hypotensive and hypothermic.  Maury hugger was applied.  Blood cultures and lactic acid performed.  Lactic acid was elevated 3.6.  Patient was given IV Zosyn and IV fluid bolus per sepsis protocol.  Magnesium 2.8.  Chemistry sodium 150 BUN 60 creatinine 1.7 glucose 88.  TSH elevated at 37, free T40.1.  Urinalysis was unremarkable.  CT brain without acute findings.  Chest x-ray is unremarkable.  Patient was discussed with critical care Dr. Arteaga who came to emergency find evaluate the patient.  Patient has been started on Levophed for blood pressure support.  Will admit to the ICU for further evaluation treatment.  Discussed with hospitalist physician    Shared decision making was utilized           Disposition:    Admission  I have discussed with the patient the results of test,  differential diagnosis, and treatment plan. They expressed clear understanding of these instructions and agrees to the plan provided.       Note to patient: The 21st Century Cures Act makes medical notes like these available to patients in the interest of transparency. However, this is a medical document intended as peer to peer communication. It is written in medical language and may contain abbreviations or verbiage that are unfamiliar. It may appear blunt or direct. Medical documents are intended to carry relevant information, facts as evident, and the clinical opinion of the practitioner.             Admission disposition: 11/25/2024  5:03 PM           Medical Decision Making      Disposition and Plan     Clinical Impression:  1. Hypothermia, initial encounter    2. Altered mental status, unspecified altered mental status type    3. Bradycardia    4. Cellulitis of upper extremity, unspecified laterality    5. Hypothyroidism, unspecified type    6. Hypotension, unspecified hypotension type    7. Hypernasality    8. AGUSTÍN (acute kidney injury) (HCC)    9. Pressure injury of skin, unspecified injury stage, unspecified location         Disposition:  Admit  11/25/2024  5:03 pm    Follow-up:  No follow-up provider specified.        Medications Prescribed:  Current Discharge Medication List              Supplementary Documentation:     [facility]   [facility]      Sepsis Reassessment Note    BP (!) 133/115   Pulse 66   Temp (!) 93 °F (33.9 °C)   Resp 22   Wt 40.8 kg   SpO2 94%   BMI 15.94 kg/m²          Cardiac:  Regularity: Regular  Rate: Bradycardic  Heart Sounds: S1,S2    Lungs:   Right: Diminished  Left: Diminished    Peripheral Pulses:  Radial: Right 1+ or Left 1+      Capillary Refill:  <3 Secs    Skin:  Temp/Moisture: Warm and Dry  Color: Normal      Brennan Giron DO  11/25/2024  10:30 PM             Hospital Problems       Present on Admission  Date Reviewed: 11/17/2023            ICD-10-CM  Noted POA    * (Principal) Hypothermia, initial encounter T68.XXXA 11/25/2024 Unknown    AGUSTÍN (acute kidney injury) ([facility]) N17.9 11/25/2024 Unknown    Altered mental status, unspecified altered mental status type R41.82 11/25/2024 Unknown    Bradycardia R00.1 11/25/2024 Unknown    Cellulitis of upper extremity, unspecified laterality L03.119 11/25/2024 Unknown    Hypothyroidism E03.9 11/25/2024 Unknown    Septic shock ([facility]) A41.9, R65.21 11/25/2024 Unknown
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
## 2024-11-26 NOTE — CONSULTS
[facility]  Report of Inpatient Wound Care Consultation    Estephania Mota Patient Status:  Inpatient    1928 MRN [id number]   Location [facility] 4SW-A Attending Cirilo Mcdonnell MD   Hosp Day # 1 PCP Cinthia Leos MD     Reason for Consultation:  Wound care    History of Present Illness:  Estephania Mota is a a(n) 89 year old female. Patient is non verbal. Patient with multiple comorbidities, with skin breakdown described below.     History:  Past Medical History:    Thyroid disease     Past Surgical History:   Procedure Laterality Date    Hip surgery        reports that she has never smoked. She does not have any smokeless tobacco history on file. She reports that she does not currently use alcohol. She reports that she does not currently use drugs.      Allergies:  @ALLERGY    Laboratory Data:    Recent Labs   Lab 24  1515 24  2127 24  0417 24  0600   WBC 6.8  --  14.2*  --    HGB 14.9  --  11.0*  --    HCT 46.4  --  33.3*  --    .0*  --  98.0*  --    CREATSERUM 1.70* 1.66* 1.71*  --    BUN 60* 54* 68*  --    GLU 88 148* 100*  --    CA 9.6 8.1* 7.6*  --    ALB 3.9  --  2.9*  --    TP 6.8  --  4.8*  --    INR  --   --  1.99*  --    PGLU  --   --   --  79         ASSESSMENT:  Wound 11/15/23 Vagina (Active)   Date First Assessed/Time First Assessed: 11/15/23 0000   Present on Original Admission: Yes  Primary Wound Type: Incontinent skin breakdown  Location: Vagina      Assessments 2024 10:49 AM   Wound Image     Site Assessment Excoriated;Moist;Red;Edema   Wound Length (cm) 0 cm   Wound Width (cm) 0 cm   Wound Surface Area (cm^2) 0 cm^2   Wound Depth (cm) 0 cm   Wound Volume (cm^3) 0 cm^3   Shape no open wound       Wound 24 Abdomen Lower;Medial (Active)   Date First Assessed/Time First Assessed: 24   Present on Original Admission: Yes  Primary Wound Type: Other (comment)  Location: Abdomen  Wound Location Orientation: Lower;Medial  Wound  Description (Comments): redness, petechiae      Assessments 11/26/2024 10:19 AM   Wound Image     Site Assessment Red   Drainage Amount None   Dressing Open to air   Wound Length (cm) 12.5 cm   Wound Width (cm) 15 cm   Wound Surface Area (cm^2) 187.5 cm^2   Wound Depth (cm) 0 cm   Wound Volume (cm^3) 0 cm^3   Margins Well-defined edges   Shape no open areas at this time, non blachable erythema       Wound 11/26/24 Leg Posterior;Right;Upper (Active)   Date First Assessed/Time First Assessed: 11/26/24 1021   Primary Wound Type: Pressure Injury  Location: Leg  Wound Location Orientation: Posterior;Right;Upper      Assessments 11/26/2024 10:22 AM   Wound Image     Site Assessment Black;Red   Drainage Amount Small   Drainage Description Yellow   Treatments Cleansed;Honey Gel   Dressing ABD Pad;Tape   Dressing Changed Changed   Dressing Status Clean;Dry;Intact   Wound Length (cm) 39.5 cm   Wound Width (cm) 6.5 cm   Wound Surface Area (cm^2) 256.75 cm^2   Wound Depth (cm) 0.1 cm   Wound Volume (cm^3) 25.675 cm^3   Margins Well-defined edges   Karoline-wound Assessment Red;Dry   Wound Bed Granulation (%) 10 %   Wound Bed Slough (%) 10 %   Wound Bed Eschar (%) 50 %   Wound Odor None   Pressure Injury Stage Unstageable       Wound 11/26/24 Toe (Comment which one) Right;Lateral (Active)   Date First Assessed/Time First Assessed: 11/26/24 1031   Location: (c) Toe (Comment which one)  Wound Location Orientation: Right;Lateral      Assessments 11/26/2024 10:33 AM   Wound Image     Site Assessment Dry;Red;Purple   Drainage Amount Scant   Drainage Description Serosanguineous   Treatments Cleansed;Betadine   Wound Length (cm) 1.2 cm   Wound Width (cm) 0.3 cm   Wound Surface Area (cm^2) 0.36 cm^2   Wound Depth (cm) 0.1 cm   Wound Volume (cm^3) 0.036 cm^3   Margins Well-defined edges   Non-staged Wound Description Full thickness   Karoline-wound Assessment Dry;Ecchymosis;Edema   Wound Odor None        Edema : +2 = Moderate pitting,  indentation subsides rapidly  Pulse: palpable faint  Capillary refill:>3  Lower Extremity Temp: warm    Wound Cleaning and Dressings:  Right upper posterior leg to trochaner  Wound cleansing:  normal saline  Wound cleaning frequency: Daily and PRN  Wound product: medi honey, abd, tape  Dressing change frequency:  Change dressing daily and/or PRN    Right 4th toe, lateral  Wound cleansing:  normal saline  Wound cleaning frequency: Daily and PRN  Wound product: betadine  Dressing change frequency: paint daily and/or PRN    ALL WOUND CARE SUPPLIES CAN BE OBTAINED FROM [facility]     Miscellaneous/Additional Orders:  Apply zinc to bilateral vaginal labia and perianal area daily and PRN. Foam border to bilateral heels for protection.   Off loading: use heel offloading boots to offload pressure from heels. Use pillows between legs when patient lying on her side. Turn Q2H. Clinitron bed. Prompt incontinent care.     If patient is Diabetic: want to make sure blood sugars are within a controled range for wound healing     Protein intake: depending on providers recommendations and patients kidney functions - if kidneys are good then recommend patient to increase protein intake (Boost, Shantanu, Ensure, Premiere Protein)       Recommendations: If patient is discharged home, follow up with home health and/or outpatient wound care clinic.       Thank you for this consultation and for allowing me to participate in the care of your patient.  Please call [phone] if you have any questions about this consultation and plan of care.     Time Spent 1 Hour.    Thank you,  Cyril Clifton RN  Wound/Ostomy/Continence nurse    11/26/2024  11:30 AM

## 2024-11-26 NOTE — PROCEDURES
Central Venous Catheter Placement Note  Attending present: Dr. Valera  : Dr. Valera  Indication: Access, monitoring, and resuscitation  Location: right IJ vein  Catheter type: Triple lumen catheter  Pre-procedure: Patient was identified in room 455.  Consented was obtained and documented from son.  Procedural pause performed.  Vital signs, laboratory studies, and allergies were reviewed.  Ultrasound was used to identify venous and arterial structures.  The patient was placed into Trendelenburg position.  Sterile gloves, gowns, masks, and caps were worn by operators.  A full body drape was placed.  The exposed skin was cleansed with chlorhexidine in the usual sterile fashion.  A sterile ultrasound probe cover was placed.  Procedure: 1% lidocaine was instilled into the dermis and subcutaneous tissue.  The catheter was flushed in the usual fashion.  Standard Seldinger technique was used to access the vein (1 stick and 1 pass).  A guidewire was easily passed.  Ultrasound was used to verify the placement of the guidewire into the vein.  A dilator was passed over the guidewire and then removed.  The central venous catheter was then passed over the guidewire.  The guidewire was removed.  The ports were aspirated, flushed, locked, and capped.  The catheter was sutured into place, and a sterile dressing was applied.    Post-procedure: The patient tolerated the procedure well.  A post-procedural chest x-ray was ordered.

## 2024-11-26 NOTE — PLAN OF CARE
Received pt at change of shift non-verbal, not following commands but will occasionally nod head appropriately.  Is very jumpy when touched.  Moves all extremities weakly.  Initially on room air, but now on 3L NC.  HR 50-70;s SB/SR now.  Maury hugger on and temp now 97.   Hypotensive on levo.  Titrated to keep SBP>90.  Midline and Kerrie placed by APN.  R hand and BLE with pitting edema.  Having multiple loose, brown stools.  Cdiff pending.  Incontinent of urine.  Lactics are trending up.  +BC, Dr. Valera aware.  TLC placed.  Needs CT A/P.

## 2024-11-26 NOTE — DIETARY MALNUTRITION NOTE
[facility]   part of [facility]  NUTRITION ASSESSMENT    Pt meets severe malnutrition criteria at this time.    CRITERIA FOR MALNUTRITION DIAGNOSIS:  Criteria for severe malnutrition diagnosis: chronic illness related to body fat severe depletion and muscle mass severe depletion    NUTRITION INTERVENTION:    RD nutrition Care Plan- Recommend EN (enteral nutrition) support if unable to take adequate PO safely within 1-2 days and Ordered multivitamin  Meal and Snacks - ADAT once more awake and alert; monitor patient po intake. Encourage adequate po of appropriate diet.  Medical Food Supplements - Will evaluate need when diet is advanced  Enteral Nutrition - Once DHT placed and confirmed in correct position, recommend initiating Jevity 1.5 at 10 ml/hr and advancing 10 ml/hr q4hrs to GOAL: Jevity 1.5 at 45 ml/hr x 22hrs (hold 1 hr before/after synthroid).   This will provide 1485 kcal, 63 grams protein, 752 ml total free water, and 99% of RDI's.   Recommend 200 ml water flush q 4 hours, TF+FWF provides 1952 ml total fluids.   Vitamin and Mineral Supplements - Recommend adding Multivitamin with minerals and Thiamine  Coordination of Nutrition Care - Recommend SLP consult prior to diet advancement. and Palliative care consult for goal of care    PATIENT STATUS: 89 year old female admitted on 11/25 presents with AMS and FTT. Pt was found by EMS with mulitple bed sores and covered in feces. Pt is nonverbal. Pt screened d/t consult for tube feeding. Pt unable to provide any history and no family at bedside. Per chart review, pt with wt loss over the past year but not significant per standards. Not appropriate to take PO at this time. No GI symptoms noted. NKFA. Wound care following for multiple wounds. Plan to place DHT and initiate TF. Will provide TF recs above. Pt at risk of refeeding syndrome so will add thiamine and recommend monitoring lytes closely once TF initiated.    PMH:  has a past medical history of  Thyroid disease.    ANTHROPOMETRICS:  Ht: 160 cm (5' 3\")  Wt: 46.5 kg (102 lb 8.2 oz).   BMI: Body mass index is 18.16 kg/m².  IBW: 52.3 kg    WEIGHT HISTORY: Per chart, pt with ~16 lb wt loss x 1 year (14%, not significant per standards).  Patient Weight(s) for the past 336 hrs:   Weight   11/26/24 0600 46.5 kg (102 lb 8.2 oz)   11/25/24 1453 40.8 kg (90 lb)       Wt Readings from Last 10 Encounters:   11/26/24 46.5 kg (102 lb 8.2 oz)   11/15/23 53.5 kg (118 lb)   12/03/15 74.4 kg (164 lb 0.4 oz)   11/05/15 76.2 kg (168 lb)   10/15/15 77.6 kg (171 lb)        NUTRITION:  Diet: No orders of the defined types were placed in this encounter.     Food Allergies: No  Cultural/Ethnic/Anglican Preferences Addressed: Yes    Percent Meals Eaten (last 3 days)       None            GI SYSTEM REVIEW: WNL; last BM 11/26  Skin/Wounds: unstageable pressure injury to RLE, full thickness wounds to R toes    NUTRITION RELATED PHYSICAL FINDINGS:     1. Body Fat/Muscle Mass: severe depletion body fat Buccal fat pad and Triceps, moderate muscle depletion Clavicle region, and severe muscle depletion Temple region     2. Fluid Accumulation: hand edema 1+, lower extremity edema 1+ and non-pitting, and foot edema 3+    NUTRITION PRESCRIPTION:  46.5 kg Actual Body Weight  Calories: 6424-4101 calories/day (30-35 kcal/kg)  Protein: 56-70 grams protein/day (1.2-1.5 gm/kg)  Fluid: ~1 ml/kcal or per MD discretion    NUTRITION DIAGNOSIS/PROBLEM:  Malnutrition related to insufficient appetite resulting in inadequate nutrition intake and inability to take or tolerate as evidenced by loss of fat mass, loss of muscle mass, and low BMI    MONITOR AND EVALUATE/NUTRITION GOALS:  Weight stable within 1 to 2 lbs during admission - New  Start alternative nutrition in 24-48 hrs if diet is not able to advance- New      MEDICATIONS:  Solucortef, synthroid, abx  Gtt: D5W at 125 ml/hr, levophed at 16 mcg/min    LABS:  Na 148, elevated LFT's    Pt is at High  nutrition risk    Marilee Schaefer RD, LDN, [facility]  Clinical Dietitian  Spectra: [phone]

## 2024-11-26 NOTE — SPIRITUAL CARE NOTE
Spiritual Care Visit Note    Patient Name: Estephania Mota Date of Spiritual Care Visit: 24   : 1928 Primary Dx: Hypothermia, initial encounter       Referred By: Referral From: Nurse    Spiritual Care Taxonomy:    Intended Effects: Journeying with someone in the grief process    Methods: Collaborate with care team member;Offer spiritual/Christian support    Interventions: Acknowledge current situation;Active listening;Identify supportive relationship(s);[place]    Visit Type/Summary:  Provided prayer as requested per family as patient is nearing death.  Granddaughter turned on Moldovan music which she says the patient would enjoy.     - Spiritual Care: Responded to a request via the on call phone Consulted with RN prior to visit. Offered empathic listening and emotional support. Provided support for Patient's spiritual/Christian requests. Offered prayer.  remains available for follow up.    Spiritual Care support can be requested via an [facility] consult. For urgent/immediate needs, please contact the On Call  at: [facility]: ext [phone]

## 2024-11-26 NOTE — CONSULTS
[facility]   part of [facility]  Palliative Care Initial Consult    Estephania Mota Patient Status:  Inpatient    1928 MRN [id number]   Location [facility] 4SW-A Attending Cirilo Mcdonnell MD   Hosp Day # 1 PCP Cinthia Leos MD   [id number]     Date of Consult: 24   Today is day #1 of hospital stay.     Palliative care consultation was requested by EDUIN Rojas for evaluation of palliative care needs and support with Goals of care discussion;Advance care planning / HPOA.    History of Present Illness:        Estephania Mota is a 89 year old female with PMH significant for hypothyroidism in addition to the other conditions noted below, presented to ED on 2024 with CC of decreased responsiveness at home. Son had called EMS who arrived to find her minimally responsive with multiple wounds, covered in feces in a diaper that had disintegrated w bugs flying around her. She was hypothermic, hypotensive, and bradycardic.  Patient is undergoing evaluation and treatment for septic shock Bcx + proteus, on pressors with empiric abx, fluid resuscitation and pressor support; AGUSTÍN, hypothermia w guanaco hugger and solucortef - endocrine consult pending + for h/o hypothyroidism and c/f myxedema, bradycardia, Acute toxic encephalopathy - CT brain neg for acute process, multiple wounds - wound on consult. Noting APS case report filed by ED.    CT a/p and echo pending.      This is the 1st hospitalization in the past 12 months.    Medical History: obtained from [employer]  Past Medical History:    Thyroid disease     Past Surgical History:   Procedure Laterality Date    Hip surgery         Allergies:  Allergies[1]    Medications:     Current Facility-Administered Medications:     glucose (Dex4) 15 GM/59ML oral liquid 15 g, 15 g, Oral, Q15 Min PRN **OR** glucose (Glutose) 40% oral gel 15 g, 15 g, Oral, Q15 Min PRN **OR** glucose-vitamin C (Dex-4) chewable tab 4 tablet, 4 tablet, Oral, Q15 Min PRN **OR**  dextrose 50% injection 50 mL, 50 mL, Intravenous, Q15 Min PRN **OR** glucose (Dex4) 15 GM/59ML oral liquid 30 g, 30 g, Oral, Q15 Min PRN **OR** glucose (Glutose) 40% oral gel 30 g, 30 g, Oral, Q15 Min PRN **OR** glucose-vitamin C (Dex-4) chewable tab 8 tablet, 8 tablet, Oral, Q15 Min PRN    vasopressin (Vasostrict) 20 Units in sodium chloride 0.9% 100 mL infusion for septic shock, 0.01-0.03 Units/min, Intravenous, Continuous    acetaminophen (Tylenol Extra Strength) tab 500 mg, 500 mg, Oral, Q6H PRN    piperacillin-tazobactam (Zosyn) 4.5 g in dextrose 5% 100 mL IVPB-ADDV, 4.5 g, Intravenous, Q8H    amikacin (Amikin) 750 mg in sodium chloride 0.9% 250 mL IVPB, 15 mg/kg, Intravenous, Once    thiamine (Vitamin B1) tab 100 mg, 100 mg, Per NG Tube, Daily    dextrose 5% infusion, , Intravenous, Continuous    norepinephrine (Levophed) 4 mg/250mL infusion premix, 0.5-30 mcg/min, Intravenous, Continuous    hydrocortisone Na succinate PF (Solu-CORTEF) injection 100 mg, 100 mg, Intravenous, Q8H    levothyroxine (Synthroid) 100 MCG/5ML injection 100 mcg, 100 mcg, Intravenous, Daily    heparin (Porcine) 5000 UNIT/ML injection 5,000 Units, 5,000 Units, Subcutaneous, Q8H CARLOS    ondansetron (Zofran) 4 MG/2ML injection 4 mg, 4 mg, Intravenous, Q6H PRN    metoclopramide (Reglan) 5 mg/mL injection 10 mg, 10 mg, Intravenous, Q8H PRN  No current outpatient medications on file.       Functional Status History:  ADLs: Dependent  (Bathing or showering, dressing, getting in and out of bed or chair, walking, using the toilet and eating)  IADLs: Dependent  (Use the phone, shop for groceries, meal preparation, manage medicines, clean living area, use transportation by self, manage money)  Falls:  ciara    Palliative Care Social History:   Marital Status:   Children: Yes - 1 son, Fermin  Living Situation Prior to Admit: Home  Does Patient Live Alone: No  Is Patient Confused: No lives with son Fermin, he lives upstairs    Social History      Socioeconomic History    Marital status:    Tobacco Use    Smoking status: Never   Substance and Sexual Activity    Alcohol use: Not Currently    Drug use: Not Currently       Substance History:   reports that she has never smoked. She does not have any smokeless tobacco history on file.  reports that she does not currently use alcohol.  reports that she does not currently use drugs.      Spiritual Assessment:   Malay Taoist - Parish Not Listed;  [place]    HPI obtained from [facility] and pt's son Fermin.    SUBJECTIVE  Review of Systems - Palliative Care Symptom Assessment     I visited Estephania while she rested in bed, she did not open her eyes or respond to sternal rub.      OBJECTIVE       Medications:    Current Facility-Administered Medications:     glucose (Dex4) 15 GM/59ML oral liquid 15 g, 15 g, Oral, Q15 Min PRN **OR** glucose (Glutose) 40% oral gel 15 g, 15 g, Oral, Q15 Min PRN **OR** glucose-vitamin C (Dex-4) chewable tab 4 tablet, 4 tablet, Oral, Q15 Min PRN **OR** dextrose 50% injection 50 mL, 50 mL, Intravenous, Q15 Min PRN **OR** glucose (Dex4) 15 GM/59ML oral liquid 30 g, 30 g, Oral, Q15 Min PRN **OR** glucose (Glutose) 40% oral gel 30 g, 30 g, Oral, Q15 Min PRN **OR** glucose-vitamin C (Dex-4) chewable tab 8 tablet, 8 tablet, Oral, Q15 Min PRN    vasopressin (Vasostrict) 20 Units in sodium chloride 0.9% 100 mL infusion for septic shock, 0.01-0.03 Units/min, Intravenous, Continuous    acetaminophen (Tylenol Extra Strength) tab 500 mg, 500 mg, Oral, Q6H PRN    piperacillin-tazobactam (Zosyn) 4.5 g in dextrose 5% 100 mL IVPB-ADDV, 4.5 g, Intravenous, Q8H    amikacin (Amikin) 750 mg in sodium chloride 0.9% 250 mL IVPB, 15 mg/kg, Intravenous, Once    thiamine (Vitamin B1) tab 100 mg, 100 mg, Per NG Tube, Daily    dextrose 5% infusion, , Intravenous, Continuous    norepinephrine (Levophed) 4 mg/250mL infusion premix, 0.5-30 mcg/min, Intravenous, Continuous    hydrocortisone Na succinate PF (Solu-CORTEF)  injection 100 mg, 100 mg, Intravenous, Q8H    levothyroxine (Synthroid) 100 MCG/5ML injection 100 mcg, 100 mcg, Intravenous, Daily    heparin (Porcine) 5000 UNIT/ML injection 5,000 Units, 5,000 Units, Subcutaneous, Q8H CARLOS    ondansetron (Zofran) 4 MG/2ML injection 4 mg, 4 mg, Intravenous, Q6H PRN    metoclopramide (Reglan) 5 mg/mL injection 10 mg, 10 mg, Intravenous, Q8H PRN    Hematology:   Lab Results   Component Value Date    WBC 14.2 (H) 11/26/2024    HGB 11.0 (L) 11/26/2024    HCT 33.3 (L) 11/26/2024    PLT 98.0 (L) 11/26/2024       Chemistry:  Lab Results   Component Value Date    CREATSERUM 1.71 (H) 11/26/2024    BUN 68 (H) 11/26/2024     (H) 11/26/2024    K 4.0 11/26/2024     (H) 11/26/2024    CO2 21.0 11/26/2024     (H) 11/26/2024    CA 7.6 (L) 11/26/2024    ALB 2.9 (L) 11/26/2024    ALKPHO 151 (H) 11/26/2024    BILT 0.4 11/26/2024    TP 4.8 (L) 11/26/2024     (H) 11/26/2024    ALT 98 (H) 11/26/2024    MG 2.0 11/26/2024    PHOS 3.8 11/26/2024       Imaging:  XR CHEST AP PORTABLE  (CPT=71045)    Result Date: 11/26/2024  CONCLUSION:  1. Interval placement of right IJ central venous catheter which is adequately positioned. 2. No acute cardiopulmonary process suggested.   LOCATION:  [facility]      Dictated by (CST): Ciera Munoz DO on 11/26/2024 at 6:45 AM     Finalized by (CST): Ciera Munoz DO on 11/26/2024 at 6:47 AM       CT BRAIN OR HEAD (CPT=70450)    Result Date: 11/25/2024  PROCEDURE:  CT BRAIN OR HEAD (13277)  COMPARISON:  [facility] , CT, CT BRAIN OR HEAD (61402), 10/15/2015, 0:33 AM.  INDICATIONS:  ams  TECHNIQUE:  Noncontrast CT scanning is performed through the brain. Dose reduction techniques were used. Dose information is transmitted to the ACR (American College of Radiology) NRDR (National Radiology Data Registry) which includes the Dose Index Registry.  PATIENT STATED HISTORY: (As transcribed by Technologist)  Altered mental status    FINDINGS: No evidence of intracranial  hemorrhage or extra-axial fluid collection. Lucencies in the deep periventricular white matter are likely sequelae of chronic small vessel ischemic disease. Mild prominence of the sulci.  There is an ossified/calcified lesion at the left cerebellopontine angle that measures 1.5 x 1.1 cm in the axial plane and 1.9 cm cranio caudally. Mild hyperostosis frontalis.  Calcifications in the basal ganglia and dentate nuclei are noted. Mild mucoperiosteal thickening of the paranasal sinuses.  Visualized portions of the mastoid air cells are unremarkable. Visualized portions of the orbits are unremarkable. IMPRESSION: 1.  Calcifications in the basal ganglia and dentate nuclei are again noted.  Sequelae of chronic small vessel ischemic disease is noted. No evidence of intracranial hemorrhage or extra-axial fluid collection.  2. Interval significant enlargement of a left cerebellopontine angle mass that is calcified.  This may represent a meningioma.  Schwannoma cannot be entirely excluded.  MRI IAC protocol with and without contrast in an outpatient basis may be done.  This is chronic in nature.    LOCATION:  [facility]   Dictated by (CST): Franklin Marlow MD on 11/25/2024 at 6:01 PM     Finalized by (CST): Franklin Marlow MD on 11/25/2024 at 6:04 PM       XR CHEST AP PORTABLE  (CPT=71045)    Result Date: 11/25/2024  CONCLUSION:  No consolidation.   LOCATION:  [facility]      Dictated by (CST): Franklin Marlow MD on 11/25/2024 at 4:55 PM     Finalized by (CST): Franklin Marlow MD on 11/25/2024 at 4:56 PM        Vital Signs:  Blood pressure 130/46, pulse 66, temperature (!) 96.3 °F (35.7 °C), temperature source Rectal, resp. rate 21, height 5' 3\" (1.6 m), weight 102 lb 8.2 oz (46.5 kg), SpO2 93%.      Supplemental O2:       Physical Exam:     GENERAL: Lethargic. Appears debilitated. Malodorous. NAD.  BODY HABITUS:  Body mass index is 18.16 kg/m².  HEENT: dry oral mucosa, + NC placed in mouth as she is mouth  breathing  CARDIAC: HR 60s per tele, SBPs 100s on above pressor  RESPIRATORY: no increased effort at rest, mouth breathing with NC in mouth.  ABDOMEN:  non distended  EXTREMITIES: BLEs flexed  NEUROLOGIC: Lethargic, not responding to me w sternal rub.   SKIN: Warm and dry. Diffuse petechiae, skin tears, diffuse wounds, wounds noted in chart and on exam    Pre-hospital Palliative Performance Scale: (pt/family reported/per chart review): unclear --- ?50%  Current Palliative Performance Scale:  20%    Palliative Performance Scale   % Ambulation Activity Level Self-Care Intake Consciousness   100 Full Normal Full   Normal Full   90 Full No disease  Normal Full Normal Full   80 Full Some disease  Normal w/effort Full Normal or  Reduced Full   70 Reduced Some disease  Can't perform job Full Normal or   Reduced Full   60 Reduced Significant disease  Can't perform hobby Occasional  Assist Normal or   Reduced Full or confused   50 Mainly sit/lie Extensive Disease  Can't do any work Partial Assist Normal or Reduced Full or confused   40 Mainly in bed Extensive Disease  Can't do any work Mainly Assist Normal or Reduced Full or confused   30 Bedbound Extensive Disease  Can't do any work Max Assist  Total Care Reduced Drowsy/confused   20 Bedbound Extensive Disease  Can't do any work Max Assist  Total Care Minimal Drowsy/confused   10 Bedbound/coma Extensive Disease  Can't do any work  coma  Max Assist  Total Care Mouth care Drowsy or coma   0 Death     Palliative Care Assessment       Goals of Care:      Provided introduction to Palliative Care and reason for consultation to Estephania's son, Fermin. Discussion included the benefits of palliative care to provide extra layer of support to patient/family who wish to continue curative or restorative medical therapies. Palliative care was differentiated from hospice philosophy and model of care by reviewing the treatment-focus with palliative care, versus comfort-focused care with hospice.  I also informed that having palliative care support does not limit medical treatment options or decisions.       Fermin apologized multiple times throughout discussion for his tangential thought process indicated he had not slept and has URI symptoms - he is concerned he may have COVID and requests that Estephania be tested as well. - Request relayed to treatment team.    Diagnostic understanding and Prognostic Awareness:  Fermin shared that he has spoken to a couple doctors and nurses and \"got a pretty good picture\" of what's going on with her.  He shared his understanding of her medical history that Dr. Leos had her on alendronate, synthroid, and calcium and Vitamin D.     He stated, \"everybody has one opinion or another, and I feel guilty that mom...this all happened so fast, her deterioration.\" When asked to expand, he said that [facility] would come out every now and then for wellness checks for elderly, and during those times, she might \"goof it up w her responses\" (ie, incorrectly drawing clocks). But, she was last \"communicative\" ~ 4-5 days ago, and after that, he became concerned that she might have been having a stroke of having complications from ortho surgery about a year ago. Given her presentation with degree of lethargy yesterday, he felt it necessary to call 911.    When asked for his  understanding of her current condition, he voiced awareness of central line placement for administration of medications to treat low BP. I educated further on acute medical conditions being treated and requiring further exploration including septic shock picture w +bcx and other blood / imaging tests pending. Discussed treatments including wound care, fluids, abx, vasopressors (referenced as \"chemical life support\"), + plan to place NGT for administration of nutrition and other medications.     Discussed palliative role in exploring her definition of QOL and advised that people make very individual decisions about  how aggressive to be with their medical care at certain times in their life and with advanced age. He recognized her advanced age, and indicated that current treatment plan is acceptable at this time.     Fermin denied further questions or concerns specific to treatment plan, and understands that further tests are pending, so he may be contacted with those results as well as to be advised on her condition, or possibly to discuss / provide consent to further treatment. He plans to remain available by phone.      Hopes / Goals:  \"The only thing I can hope for at this point is that she can hold on and she can get better.\"    Concerns / Fears:  Concerned that \"depending on how things go, we may need more help for her intimate things.\"  Concerned that his symptoms limiting ability to support her in-person his car troubles limiting transportation        Advance Care Planning:    Code Status:  DNAR/Selective Treatment. Per existing order. Not addressed.    HCPOA:  No document on file.  Healthcare Agent Appointed: Yes  Healthcare Agent's Name: Surrogate is Fermin garduno  Healthcare Agent's Phone Number: [phone]        Problem List:       Principal Problem:    Hypothermia, initial encounter  Active Problems:    Altered mental status, unspecified altered mental status type    Bradycardia    Cellulitis of upper extremity, unspecified laterality    Septic shock (HCC)    AGUSTÍN (acute kidney injury) (HCC)    Hypothyroidism    Shock (HCC)    Palliative care encounter    Counseling regarding advance care planning and goals of care      Palliative Care Recommendations / Plan:       Goals of Care:   Discussions are ongoing. Son is struggling w his own health and his ability to care for her. May need to involve another decision-maker if he continues to struggle with recall and understanding of her medical care and needs.  He stated his daughter Beba is good to support to him, and can be contacted if he is not reachable.  Current ICU  testing and care + NGT placement confirmed to be in-line w GOC.  Relayed son's request that she be tested for covid to treatment team.    Code Status: DNAR/Selective Treatment.  Deferred.  POLST deferred, GOC are being defined.    HCPOA: Surrogate is son, Fermin.      Psychosocial:  Emotional support provided to Fermin.    Disposition:  pending clinical course.      A total of 70 minutes were spent on this consult, which included all of the following:  Chart review, direct face to face contact, history taking, physical examination, counseling and coordinating care, and documentation.     I reviewed the above with patient's RN, ICU (MD, NP) and primary MD and NP.    Thank you for inviting Palliative Care Service to participate in the care of Estephania Mota and family.       Will follow.      EDUIN Sanchez  Palliative Care    11/26/2024  12:21 PM      The 21st Century Cures Act makes medical notes like these available to patients in the interest of transparency. Please be advised this is a medical document. Medical documents are intended to carry relevant information, facts as evident, and the clinical opinion of the practitioner. The medical note is intended as a peer to peer communication, and may appear blunt or direct. It is written in medical language and may contain abbreviations or verbiage that are unfamiliar.         [1] No Known Allergies

## 2024-11-26 NOTE — PROGRESS NOTES
[facility]   part of [facility]     Hospitalist Progress Note     Estephania Mota Patient Status:  Inpatient    1928 MRN [id number]   Location [facility] 4SW-A Attending Cirilo Mcdonnell MD   Hosp Day # 1 PCP Cinthia Leos MD     Chief Complaint: minimally responsive / failure to thrive     Subjective:     Patient unresponsive nonverbal minimal response to deep pain x 4    Objective:    Review of Systems:   A comprehensive review of systems was completed; pertinent positive and negatives stated in subjective.  Unable to do with patient due to decreased mental status    Vital signs:  Temp:  [73 °F (22.8 °C)-98.1 °F (36.7 °C)] 96.3 °F (35.7 °C)  Pulse:  [42-72] 66  Resp:  [17-33] 21  BP: ()/() 130/46  SpO2:  [90 %-100 %] 93 %  AO: ()/(32-55) 122/45    Physical Exam:    General: No acute distress unresponsive nonverbal minimal response to pain   Respiratory: Congested diminished oral breathing   cardiovascular: S1, S2, regular rate and rhythm normal sinus rhythm  Abdomen: Soft, Non-tender, non-distended, scant bowel sounds  Neuro: Nonverbal minimal response to pain does not respond to verbal stimuli  Extremities: No edema  Has petechiae on her right thigh, dressing on right thigh necrotic wounds with odor, wounds on heels  Maury hugger on for temperature       Diagnostic Data:    Labs:  Recent Labs   Lab 24  1515 24  0417   WBC 6.8 14.2*   HGB 14.9 11.0*   MCV 75.2* 72.9*   .0* 98.0*   BAND 2 6   INR  --  1.99*       Recent Labs   Lab 24  1515 24  1630 24  2127 24  0417   GLU 88  --  148* 100*   BUN 60*  --  54* 68*   CREATSERUM 1.70*  --  1.66* 1.71*   CA 9.6  --  8.1* 7.6*   ALB 3.9  --   --  2.9*   *  --  149* 148*   K  --  3.8 4.0 4.0   *  --  116* 113*   CO2 24.0  --  18.0* 21.0   ALKPHO 182*  --   --  151*   AST  --  200*  --  166*   *  --   --  98*   BILT 0.4  --   --  0.4   TP 6.8  --   --  4.8*       Estimated  Creatinine Clearance: 14.4 mL/min (A) (based on SCr of 1.71 mg/dL (H)).    Recent Labs   Lab 11/25/24  1630 11/26/24 0417   * 787*       Recent Labs   Lab 11/26/24 0417   PTP 22.8*   INR 1.99*       Lab Results   Component Value Date    TSH 37.939 11/25/2024    T4F 0.1 11/25/2024             Microbiology    Hospital Encounter on 11/25/24   1. Blood Culture     Status: Abnormal (Preliminary result)    Collection Time: 11/25/24  3:47 PM    Specimen: Blood,peripheral   Result Value Ref Range    Blood Culture Result Positive N/A    Blood Smear Positive Blood Culture (A) N/A    Blood Smear Gram Negative Rods (A) N/A         Imaging: Reviewed in Epic.    Medications:    piperacillin-tazobactam  4.5 g Intravenous Q8H    amikacin  15 mg/kg Intravenous Once    thiamine  100 mg Per NG Tube Daily    hydrocortisone Na succinate PF  100 mg Intravenous Q8H    levothyroxine  100 mcg Intravenous Daily    heparin  5,000 Units Subcutaneous Q8H CARLOS       Assessment & Plan:       #Septic Shock-  bacteremia  proteus PCR ,   Continue empiric abx  Pressor support increasing Levophed drip  Follow cultures  Monitor in ICU  Follow LA remains elevated at 4  Needing Maury hugger     #Acute Kidney Injury  Creatinine level increased  Monitor renal function     #Hypothermia  Likely related to sepsis and thyroid disorder-  IV synthroid  Endocirne eval P  Continue maury hugger  IV solucortef    #Severe Hypothyroid/Possibel Myxedema  IV synthroid  Endocrine eval p     #Bradycardia monitor likely related to lower temperature and thyroid  Likely related to hypothermia and thyroid  Monitor response to treatment above     #Acute Toxic Encephalopathy  CT head without active bleed or CVA, chronic vascular changes     #L Cerebellowpontine angle mass  Consider MRI IAC protocol w/wo as outpatient    # Wounds right thigh feels present on admission  Wound care following  Continue dressings     #Failure to thrive  Social work eval   Was already  reported   to adult protective services   -    # FEN  Insert Dobbhoff start tube feedings    #goc-palliative care following son wants to continue aggressive therapies at this point hoping she will recover.   POC   Amikican , zosyn   bacteremia  Proteus     Stress steroids   IV synthroid    Na 148   Cr worse 1.71   LA still 4.0   INR elevated   WBC elevated   plts dropping   Echo P   Ct chest abd results pending  Remains critically sick minimal response requiring increased pressor need worsening organ function, Maury garcia.  Palliative care following  Follow cultures as odor from the wounds  Sharyn Fernandes NP    Addendum:    Agree with above note except as documented below.      Gen: intubated, sedated  CVS: s1s2  Resp: CTA  Abd: soft  Skin: rashes    #septic shock  #septicemia  #necrotic bowel  #lactic acidosis  #AGUSTÍN  #hypothermia  #Mxedema  #bradycardia  #DAVID  #brain mass  #skin wounds and rashes  #FTT  -pressors  -image findings reviewed independently. Has dying bowel. Not a surgical candidate  -family agreed to comfort measures  -morphine gtt      Pt seen and examined independently. Chart reviewed. Labs and imaging over the last 24 hours have been personally reviewed.  I personally made/approved 100% of the management plan for this patient and take full responsibility for the patient management.   Note has been reviewed by me and modified as needed.  Exam and Impression/ Recs as noted above.  D/w staff.    David Newsome MD    Supplementary Documentation:     Quality:  DVT Mechanical Prophylaxis:   SCDs,    DVT Pharmacologic Prophylaxis   Medication    heparin (Porcine) 5000 UNIT/ML injection 5,000 Units                Code Status: DNAR/Selective Treatment  Torres: External urinary catheter in place  Torres Duration (in days):   Central line: central venous catheter in place  RUT:     Discharge is dependent on: clinical course  At this point Ms. Mota is expected to be discharge to: TBD     The 21st Century Cures Act  makes medical notes like these available to patients in the interest of transparency. Please be advised this is a medical document. Medical documents are intended to carry relevant information, facts as evident, and the clinical opinion of the practitioner. The medical note is intended as peer to peer communication and may appear blunt or direct. It is written in medical language and may contain abbreviations or verbiage that are unfamiliar.

## 2024-11-26 NOTE — PROCEDURES
Arterial Line  Performed by: Bibi DENNY     General Information and Staff     Procedure Start: 2330  Patient Location:  ICU  Indication: continuous blood pressure monitoring and blood sampling needed    Site Identification: real time ultrasound guided, sterile technique used     Procedure Details     Catheter Size:  20 G  Catheter Length:  1 and 3/4 inchCatheter Type:  Arrow  Seldinger Technique?: Yes    Laterality:  Left  Site: Radial    Site Prep: chlorhexidine  Lidocaine 1% injected at site prior to start of procedure.  Line Secured:  Tape and Tegaderm     Assessment: Sergio's test negative, Good flash, guidewire and catheter advanced without difficulty, pulsatile blood flow noted.    Events: patient tolerated procedure well with no complications

## 2024-11-27 NOTE — CDS QUERY
Constanza Fernandes NP:   Can you please clarify a diagnosis associated with the clinical findings and oxygen usage below:  (  x)   Acute Respiratory Failure  (  )   Other (please specify)           __h&p: septic shock - pressor support; malgorzata - ivf; hypothermia - likely r/t sepsis and thyroid disoder; severe hypothyroid/possible myxedema; bradycardia likely r/t hypothermia and thyroid; acute toxic encephalopathy ; L cerebellopontine angle mass; ftt;    __ icu: Noted to have more retractions with breaths and very diminished breath sounds bilaterally. Concern for airway protection in setting of altered mental status  __d/c summ: CT abdomen pelvis markedly abnormal small bowel colon and stomach. Highly suspicious for necrotic bowel  Was made comfort care patient  secondary to worsening respiratory status    24 0600 97 °F (36.1 °C) 71 20 130/46 93 % 102 lb 8.2 oz (46.5 kg) Nasal cannula 2 L/min   24 0800 96.3 °F (35.7 °C) Abnormal  68 27 Abnormal  -- 93 % -- Nasal cannula 3 L/min   24 1400 -- 64 20 110/43 92 % -- Non-rebreather mask 15 L/min     Treatment:  oxygen via non rebreather and nasal cannula           Use of terms such as suspected, possible, or probable (associated with a specific diagnosis that is being evaluated, monitored, or treated as if it exists) are acceptable and can be coded in the inpatient setting, when documented at the time of discharge.        Please add any additional documentation to your progress note and continue to document this through discharge.         If you have any questions, please contact Clinical : Ethel Mohan RN, CDS  at cell: [phone]. Thank You!  THIS FORM IS A PERMANENT PART OF THE MEDICAL RECORD

## 2024-11-27 NOTE — CDS QUERY
Deadesirae Fernandes NP  Please provide a nutritional diagnosis, if known, related to the clinical information below:   [  x ] Severe  Malnutrition  [   ] Other (please specify) :_________________________      Documentation from the Medical Record:       Dietary note:   Pt meets severe malnutrition criteria at this time.    CRITERIA FOR MALNUTRITION DIAGNOSIS:    Criteria for severe malnutrition diagnosis: chronic illness related to body fat severe depletion and muscle mass severe depletion    Treatment:   Medical Food Supplements - Will evaluate need when diet is advanced  Enteral Nutrition - Once DHT placed and confirmed in correct position, recommend initiating Jevity 1.5 at 10 ml/hr and advancing 10 ml/hr q4hrs to GOAL: Jevity 1.5 at 45 ml/hr x 22hrs (hold 1 hr before/after synthroid).  This will provide 1485 kcal, 63 grams protein, 752 ml total free water, and 99% of RDI's. Recommend 200 ml water flush q 4 hours, TF+FWF provides 1952 ml total fluids.    __ h&p: septic shock - pressor support; malgorzata - ivf; hypothermia - likely r/t sepsis and thyroid disorder; severe hypothyroid/possible myxedema; bradycardia likely r/t hypothermia and thyroid; acute toxic encephalopathy ; L cerebellopontine angle mass; ftt;    __ 11/26 critical care consult: Cachetic appearing; 2+pitting edema from mid thigh to toes shock - likely septic; possible wound infections; mild rhabdomyolysis; hypernatremia; malgorzata - likely prerenal d/t dehydration; poor po intake - crea on admit 1.7; strict i/o; avoid nephrotoxic agents; acute encephalopathy likely d/t hypoactive delirium from severe hypothyroidism with dehydration and sepsis; thrombocytopenia, coagulopathy likely d/t sepsis; acute on chronic microcytic anemia - drop - likely dilutional ; tranaminitis; ftt; hypoalbuminemia; hypernatremia - d5 ivf           Use of terms such as suspected, possible, or probable (associated with a specific diagnosis that is being evaluated, monitored, or  treated as if it exists) are acceptable and can be coded in the inpatient setting, when documented at the time of discharge.                                                              Please add any additional documentation to your progress note and continue to document this through discharge.    Thank you. For questions regarding this query, please contact Clinical : Ethel Mohan RN, CDS at [phone]  This form is a permanent part of the medical record.

## 2024-11-27 NOTE — DISCHARGE SUMMARY
[facility]     Estephania Mota Patient Status:  Inpatient    1928 MRN [id number]   Location [facility] 4SW-A Attending David Newsome MD    Hosp Day # 1 PCP Cinthia Leos MD     Date of Admission: 2024  Date of Discharge:   2024    Discharge Disposition:  time of death 1730    Discharge Diagnosis:  Septic shock bacteremia Proteus PCR , necrotic bowel  Acute kidney injury  Hypothermia likely due to sepsis and thyroid disorder  Severe hypo-thyroid/possible myxedema  Bradycardia likely lowered to hyperthermia and hypothyroidism  Acute toxic encephalopathy has chronic vascular changes on CT scan nothing acute  Left cerebellar pontine angle mass  Wounds right thigh feet present on admission  Failure to thrive    History of Present Illness:     Estephania Mota is a 89 year old female with PMhx of thyroid disease presents with decreased mentation and failure to thrive. Pt was found by EMS with mulitple bed sores and covered in feces. Pt is nonverbal and has bugs flying around her. On arrival to ED pt was ntoed to have hypothermic, hypotensive, bradycardic. Pt is minimally responsive. She has history of thyroid disease and is unclear if she has been on any of her medications. Pt was given IVF and guanaco hugger and empiric abx and pressor support. Pt is DNR/S. No family at bedside.        Brief Synopsis:   Patient was brought in with failure to thrive decreased mental alertness multiple bedsores covered in feces nonverbal had bugs flying around her per admitting note hypothermic hypotensive and bradycardic minimally responsive blood cultures were obtained patient was admitted to the ICU needed pressors given IV fluid resuscitation.  Lactic acid did not change despite fluid resuscitation.  Patient went down for CT scan was found to have markedly abnormal results felt to be necrotic bowel.  This is nonsurvivable due to her advanced age and other comorbidities as renal status  was worsening minimal responsiveness and decreasing respiratory status.  Patient was made comfort care after critical care care NP talked with son on phone.  Levophed was stopped comfort measures instituted patient  at 1730.  Per notes patient has not ME case  Patient has history of hypothyroidism found to be markedly hypothyroid was started on steroids IV thyroid replacement      Procedures during hospitalization:   Echocardiogram report pending at time of discharge summary  CT abdomen pelvis markedly abnormal small bowel colon and stomach.  Highly suspicious for necrotic bowel exact location and etiology are indeterminate due to noncardiac chest study  CT head  Chest x-ray    Incidental or significant findings and recommendations (brief descriptions):  CT shows necrotic bowel  Positive blood culture with Proteus by PCR  C. difficile negative  Was made comfort care patient  secondary to worsening respiratory status hypotension  ME notified not 's case per nursing note    Lab/Test results pending at Discharge:   Echo  Anaerobic culture from right thigh  Blood culture    Consultants:  Critical care intensivist, palliative care, endocrinology, wound care  Vital signs:  Temp:  [73 °F (22.8 °C)-98.1 °F (36.7 °C)] 97.3 °F (36.3 °C)  Pulse:  [54-72] 54  Resp:  [18-33] 20  BP: ()/() 119/44  SpO2:  [90 %-95 %] 93 %  AO: ()/(18-55) 42/18    -----------------------------------------------------------------------------------------------  PATIENT DISCHARGE INSTRUCTIONS: See electronic chart    Sharyn Fernandes NP    ADDENDUM:    Agree w/ above.  Please see PN from day of DC for further details.    38 min spent on discharge independently     David Newsome MD    The  Cures Act makes medical notes like these available to patients in the interest of transparency. Please be advised this is a medical document. Medical documents are intended to carry relevant information, facts as  evident, and the clinical opinion of the practitioner. The medical note is intended as peer to peer communication and may appear blunt or direct. It is written in medical language and may contain abbreviations or verbiage that are unfamiliar.

## 2024-11-27 NOTE — PLAN OF CARE
Pt report received at bedside. Pt noted to be malodorous. Wound care came to bedside and assessed all wounds. See flowsheets and media for more details. Family updated throughout the day. After pt came back from CT, the pt was starting to agonally breath. CCAPRN notified and assessed at bedside. Family notified. CT results came back critical, reports given to critical care team. Family notified.   Family opt'd to transition pt to comfort care. Waited for family to come to bedside.  visited. Care withdrawn.      Second RN confirmed death at 1730.    No belongings to return to family. All discharge navigators and report of death packet completed.    D/T having been a possible corners case, de-lining and bagging of the body was endorsed to NOC shift.  declared pt is NOT a corners case.   NOC endorsed.

## 2024-11-30 LAB
BACTERIA BLD CULT: POSITIVE
BACTERIA BLD CULT: POSITIVE

## (undated) DEVICE — SLEEVE COMPR M KNEE LEN SGL USE KENDALL SCD

## (undated) DEVICE — STERILE POLYISOPRENE POWDER-FREE SURGICAL GLOVES: Brand: PROTEXIS

## (undated) DEVICE — GUIDEWIRE ORTH L400MM DIA3.2MM FOR TFN

## (undated) DEVICE — BANDAGE COMPR W3XL180IN FOAM 1 LAYR SELF ADH

## (undated) DEVICE — SUTURE MCRYL SZ 2-0 L27IN ABSRB UD SH L26MM

## (undated) DEVICE — PROXIMATE SKIN STAPLERS (35 WIDE) CONTAINS 35 STAINLESS STEEL STAPLES (FIXED HEAD): Brand: PROXIMATE

## (undated) DEVICE — LIGHT HANDLE

## (undated) DEVICE — DRESS WOUND AQUACEL 3.5INX6INL

## (undated) DEVICE — SOLUTION IRRIG 1000ML 0.9% NACL USP BTL

## (undated) DEVICE — SUTURE VCRL SZ 0 L27IN ABSRB UD L36MM CT-1

## (undated) DEVICE — HIP PINNING CDS: Brand: MEDLINE INDUSTRIES, INC.

## (undated) DEVICE — BIT DRL L330MM DIA4.2MM CALIB 100MM 3 FLUT QC